# Patient Record
Sex: MALE | Race: WHITE | NOT HISPANIC OR LATINO | Employment: OTHER | URBAN - METROPOLITAN AREA
[De-identification: names, ages, dates, MRNs, and addresses within clinical notes are randomized per-mention and may not be internally consistent; named-entity substitution may affect disease eponyms.]

---

## 2023-03-11 LAB
CHOLESTEROL (MG/DL) IN SER/PLAS: 165 MG/DL (ref 0–199)
CHOLESTEROL IN HDL (MG/DL) IN SER/PLAS: 55.6 MG/DL
CHOLESTEROL/HDL RATIO: 3
LDL: 86 MG/DL (ref 0–99)
TRIGLYCERIDE (MG/DL) IN SER/PLAS: 119 MG/DL (ref 0–149)
VLDL: 24 MG/DL (ref 0–40)

## 2023-03-29 ENCOUNTER — HOSPITAL ENCOUNTER (OUTPATIENT)
Dept: DATA CONVERSION | Facility: HOSPITAL | Age: 68
End: 2023-03-29
Attending: INTERNAL MEDICINE | Admitting: INTERNAL MEDICINE

## 2023-03-29 DIAGNOSIS — Z95.1 PRESENCE OF AORTOCORONARY BYPASS GRAFT: ICD-10-CM

## 2023-03-29 DIAGNOSIS — J90 PLEURAL EFFUSION, NOT ELSEWHERE CLASSIFIED: ICD-10-CM

## 2023-03-29 DIAGNOSIS — R06.02 SHORTNESS OF BREATH: ICD-10-CM

## 2023-03-29 LAB
AMYLASE (U/L) IN BODY FLUID: 23 U/L
CELL CT,FLUID PATH REVIEW: NORMAL
CELLS COUNTED TOTAL (#) IN BODY FLUID: 100
CLARITY FLUID: NORMAL
COLOR OF BODY FLUID: NORMAL
ERYTHROCYTES (/UL) IN BODY FLUID: NORMAL /UL
GLUCOSE (MG/DL) IN BODY FLUID: 114 MG/DL
LACTATE DEHYDROGENASE (U/L) IN BODY FLUID BY LAC->PYR: 135 U/L
LEUKOCYTES (/UL) IN BODY FLUID: 2311 /UL
LYMPHOCYTES/100 LEUKOCYTES IN BODY FLUID BY MAN CT: 75 %
MONOCYTES+MACROPHAGES/100 WBC IN BODY FLUID BY MAN CT: 13 %
NEUTROPHILS/100 LEUKOCYTES IN BODY FLUID BY MANUAL COUNT: 1 %
PH OF BODY FLUID: 6.51
PROTEIN (G/DL) IN BODY FLUID: 4.4 G/DL
TRIGLYCERIDES (MG/DL) IN BODY FLUID: 32 MG/DL

## 2023-03-31 LAB
COMPLETE PATHOLOGY REPORT: NORMAL
CONVERTED CLINICAL DIAGNOSIS-HISTORY: NORMAL
CONVERTED DIAGNOSIS COMMENT: NORMAL
CONVERTED FINAL DIAGNOSIS: NORMAL
CONVERTED FINAL REPORT PDF LINK TO COPY AND PASTE: NORMAL
CONVERTED SPECIMEN DESCRIPTION: NORMAL

## 2023-10-22 DIAGNOSIS — I73.9 PAD (PERIPHERAL ARTERY DISEASE) (CMS-HCC): Primary | ICD-10-CM

## 2023-10-23 RX ORDER — CLOPIDOGREL BISULFATE 75 MG/1
75 TABLET ORAL DAILY
Qty: 30 TABLET | Refills: 0 | OUTPATIENT
Start: 2023-10-23

## 2023-10-27 RX ORDER — CLOPIDOGREL BISULFATE 75 MG/1
1 TABLET ORAL DAILY
COMMUNITY
Start: 2019-11-05 | End: 2023-10-27 | Stop reason: SDUPTHER

## 2023-11-20 DIAGNOSIS — I73.9 PAD (PERIPHERAL ARTERY DISEASE) (CMS-HCC): Primary | ICD-10-CM

## 2023-11-21 ENCOUNTER — OFFICE VISIT (OUTPATIENT)
Dept: VASCULAR SURGERY | Facility: CLINIC | Age: 68
End: 2023-11-21
Payer: MEDICARE

## 2023-11-21 VITALS — BODY MASS INDEX: 21.97 KG/M2 | HEIGHT: 67 IN | HEART RATE: 71 BPM | WEIGHT: 140 LBS | OXYGEN SATURATION: 95 %

## 2023-11-21 DIAGNOSIS — I73.9 CLAUDICATION (CMS-HCC): ICD-10-CM

## 2023-11-21 DIAGNOSIS — I65.23 BILATERAL CAROTID ARTERY STENOSIS: ICD-10-CM

## 2023-11-21 DIAGNOSIS — I73.9 PAD (PERIPHERAL ARTERY DISEASE) (CMS-HCC): ICD-10-CM

## 2023-11-21 PROCEDURE — 1159F MED LIST DOCD IN RCRD: CPT | Performed by: SURGERY

## 2023-11-21 PROCEDURE — 99214 OFFICE O/P EST MOD 30 MIN: CPT | Performed by: SURGERY

## 2023-11-21 PROCEDURE — 1160F RVW MEDS BY RX/DR IN RCRD: CPT | Performed by: SURGERY

## 2023-11-21 RX ORDER — ATORVASTATIN CALCIUM 40 MG/1
40 TABLET, FILM COATED ORAL
COMMUNITY
Start: 2023-09-27 | End: 2023-12-21 | Stop reason: SDUPTHER

## 2023-11-21 RX ORDER — ASPIRIN 81 MG/1
81 TABLET ORAL DAILY
COMMUNITY
Start: 2022-10-14

## 2023-11-21 RX ORDER — OMEGA-3/DHA/EPA/FISH OIL 300-1000MG
CAPSULE,DELAYED RELEASE (ENTERIC COATED) ORAL
COMMUNITY
Start: 2022-10-14

## 2023-11-21 RX ORDER — CLOPIDOGREL BISULFATE 75 MG/1
75 TABLET ORAL DAILY
Qty: 90 TABLET | Refills: 2 | Status: SHIPPED | OUTPATIENT
Start: 2023-11-21 | End: 2023-12-12 | Stop reason: ALTCHOICE

## 2023-11-21 RX ORDER — AMIODARONE HYDROCHLORIDE 200 MG/1
200 TABLET ORAL
COMMUNITY
Start: 2022-12-20 | End: 2024-01-31 | Stop reason: WASHOUT

## 2023-11-21 RX ORDER — SIMVASTATIN 40 MG/1
40 TABLET, FILM COATED ORAL DAILY
COMMUNITY
End: 2023-11-21 | Stop reason: ALTCHOICE

## 2023-11-21 RX ORDER — CLINDAMYCIN HYDROCHLORIDE 150 MG/1
150 CAPSULE ORAL
COMMUNITY
Start: 2023-04-11 | End: 2023-12-12 | Stop reason: ALTCHOICE

## 2023-11-21 RX ORDER — OXYCODONE HYDROCHLORIDE 5 MG/1
5 TABLET ORAL
COMMUNITY
Start: 2022-12-20 | End: 2023-12-12 | Stop reason: ALTCHOICE

## 2023-11-21 RX ORDER — CLOPIDOGREL BISULFATE 75 MG/1
75 TABLET ORAL DAILY
Qty: 30 TABLET | Refills: 0 | Status: SHIPPED | OUTPATIENT
Start: 2023-11-21 | End: 2023-12-12 | Stop reason: SDUPTHER

## 2023-11-21 RX ORDER — POLYSACCHARIDE-IRON COMPLEX 150 MG/1
150 CAPSULE ORAL DAILY
COMMUNITY
Start: 2022-12-20 | End: 2023-12-12 | Stop reason: ALTCHOICE

## 2023-11-21 RX ORDER — METOPROLOL SUCCINATE 50 MG/1
50 TABLET, EXTENDED RELEASE ORAL 2 TIMES DAILY
COMMUNITY
Start: 2022-09-28

## 2023-11-21 RX ORDER — LISINOPRIL 10 MG/1
10 TABLET ORAL
COMMUNITY
Start: 2023-11-20 | End: 2024-01-31 | Stop reason: SDUPTHER

## 2023-11-21 RX ORDER — HYDROGEN PEROXIDE 3 %
20 SOLUTION, NON-ORAL MISCELLANEOUS DAILY
COMMUNITY
Start: 2022-10-14

## 2023-11-21 RX ORDER — FUROSEMIDE 20 MG/1
20 TABLET ORAL
COMMUNITY
Start: 2023-09-27 | End: 2023-12-21 | Stop reason: SDUPTHER

## 2023-11-21 RX ORDER — ALLOPURINOL 100 MG/1
100 TABLET ORAL DAILY
COMMUNITY
Start: 2022-08-10

## 2023-11-21 ASSESSMENT — ENCOUNTER SYMPTOMS
HEMATOLOGIC/LYMPHATIC NEGATIVE: 1
ENDOCRINE NEGATIVE: 1
PSYCHIATRIC NEGATIVE: 1
RESPIRATORY NEGATIVE: 1
CARDIOVASCULAR NEGATIVE: 1
GASTROINTESTINAL NEGATIVE: 1
NEUROLOGICAL NEGATIVE: 1
CONSTITUTIONAL NEGATIVE: 1
MUSCULOSKELETAL NEGATIVE: 1
ALLERGIC/IMMUNOLOGIC NEGATIVE: 1

## 2023-11-21 NOTE — PROGRESS NOTES
History Of Present Illness  Josh Rehman Jr. is a 68 y.o. male presenting for carotid evaluation.  He states he had a visit with his PCP who heard a bruit in his carotid artery.  He was therefore sent for carotid duplex.  He denies any history of stroke.  He denies any lateralizing symptoms.  Carotid duplex done at [facility] reveals no significant stenosis bilaterally.    Patient is also known to me for PAD.  He has history of cross femoral bypass with subsequent interventions.  His last follow-up was 2 years ago.  At this time he does report mild claudication symptoms in his left calf.  However he states that it is pretty manageable and does not bother him much.  He states he quit smoking 1 year ago just before he had CABG surgery.     Past Medical History  He has a past medical history of Personal history of other diseases of the circulatory system (11/15/2022), Personal history of other diseases of the circulatory system (11/15/2022), Personal history of other diseases of the circulatory system (11/15/2022), Personal history of other diseases of the circulatory system (11/15/2022), and Personal history of other endocrine, nutritional and metabolic disease (11/15/2022).    Surgical History  He has a past surgical history that includes Other surgical history (05/30/2019); Other surgical history (05/30/2019); Other surgical history (11/26/2019); Other surgical history (01/05/2023); Other surgical history (11/21/2022); Other surgical history (06/22/2021); CT angio aorta and bilateral iliofemoral runoff w and or wo IV contrast (5/6/2019); and CT angio coronary art with heartflow if score >30% (8/19/2022).     Social History  He has no history on file for tobacco use, alcohol use, and drug use.    Family History  No family history on file.     Allergies  Cilostazol and Penicillins    Review of Systems   Constitutional: Negative.    HENT: Negative.     Respiratory: Negative.     Cardiovascular: Negative.          Claudication   Gastrointestinal: Negative.    Endocrine: Negative.    Genitourinary: Negative.    Musculoskeletal: Negative.    Allergic/Immunologic: Negative.    Neurological: Negative.    Hematological: Negative.    Psychiatric/Behavioral: Negative.          Physical Exam  Vitals reviewed.   Constitutional:       General: He is not in acute distress.     Appearance: Normal appearance. He is normal weight.   HENT:      Head: Normocephalic and atraumatic.   Eyes:      Extraocular Movements: Extraocular movements intact.      Conjunctiva/sclera: Conjunctivae normal.      Pupils: Pupils are equal, round, and reactive to light.   Neck:      Vascular: No carotid bruit.   Cardiovascular:      Rate and Rhythm: Normal rate and regular rhythm.      Pulses:           Femoral pulses are 2+ on the right side and 2+ on the left side.       Dorsalis pedis pulses are detected w/ Doppler on the right side and detected w/ Doppler on the left side.        Posterior tibial pulses are detected w/ Doppler on the right side and detected w/ Doppler on the left side.      Heart sounds: Normal heart sounds.   Pulmonary:      Effort: Pulmonary effort is normal.      Breath sounds: Normal breath sounds.   Abdominal:      General: Abdomen is flat. Bowel sounds are normal.      Palpations: Abdomen is soft.   Musculoskeletal:         General: No swelling or tenderness. Normal range of motion.      Cervical back: Normal range of motion and neck supple. No tenderness.   Skin:     General: Skin is warm and dry.      Capillary Refill: Capillary refill takes less than 2 seconds.   Neurological:      General: No focal deficit present.      Mental Status: He is alert and oriented to person, place, and time.      Cranial Nerves: No cranial nerve deficit.      Sensory: No sensory deficit.      Motor: No weakness.   Psychiatric:         Mood and Affect: Mood normal.         Behavior: Behavior normal.          Last Recorded Vitals  Pulse 71, height 1.702  "m (5' 7\"), weight 63.5 kg (140 lb), SpO2 95 %.    Relevant Results      Current Outpatient Medications:     allopurinol (Zyloprim) 100 mg tablet, Take 1 tablet (100 mg) by mouth once daily., Disp: , Rfl:     aspirin 81 mg EC tablet, Take 1 tablet (81 mg) by mouth once daily., Disp: , Rfl:     atorvastatin (Lipitor) 40 mg tablet, 1 tablet (40 mg)., Disp: , Rfl:     clindamycin (Cleocin) 150 mg capsule, 1 capsule (150 mg)., Disp: , Rfl:     clopidogrel (Plavix) 75 mg tablet, Take 1 tablet (75 mg) by mouth once daily., Disp: 90 tablet, Rfl: 2    clopidogrel (Plavix) 75 mg tablet, TAKE 1 TABLET DAILY, Disp: 30 tablet, Rfl: 0    esomeprazole (NexIUM) 20 mg DR capsule, Take 1 capsule (20 mg) by mouth once daily., Disp: , Rfl:     furosemide (Lasix) 20 mg tablet, 1 tablet (20 mg)., Disp: , Rfl:     iFerex 150 150 mg iron capsule, Take 1 capsule (150 mg) by mouth once daily., Disp: , Rfl:     lisinopril 10 mg tablet, 1 tablet (10 mg)., Disp: , Rfl:     metoprolol succinate XL (Toprol-XL) 50 mg 24 hr tablet, Take 1 tablet (50 mg) by mouth 2 times a day., Disp: , Rfl:     omega 3-dha-epa-fish oil (Fish OiL) 300-1,000 mg capsule,delayed release(DR/EC), Take by mouth once daily., Disp: , Rfl:     oxyCODONE (Roxicodone) 5 mg immediate release tablet, 1 tablet (5 mg)., Disp: , Rfl:     amiodarone (Pacerone) 200 mg tablet, 1 tablet (200 mg)., Disp: , Rfl:      US CAROTID BILAT + DOPPLER    Result Date: 11/17/2023  EXAMINATION: US CAROTID BILAT + DOPPLER 11/17/2023 01:46 PM CLINICAL HISTORY: right sided carotid bruit ASSOCIATED DIAGNOSIS: Bruit of right carotid artery ORDERING PROVIDER: BRIAN WETZEL COMPARISON: None TECHNIQUE: Ultrasound real time duplex ultrasound was performed of the carotid arterial systems bilaterally from the level of the clavicles to the mandibular angles using grayscale, spectral Doppler, and color Doppler. FINDINGS: RIGHT CAROTID SYSTEM: There is atherosclerotic plaque and/or intimal thickening visible " at the ICA. LEFT CAROTID SYSTEM: There is atherosclerotic plaque and/or intimal thickening visible at the ICA. PEAK SYSTOLIC VELOCITIES: RIGHT CCA: PROXIMAL- 105 cm/sec DISTAL- 79 cm/sec RIGHT ICA: PROXIMAL- 114 cm/sec MID- 102 cm/sec DISTAL- 126 cm/sec RIGHT ECA: 89 cm/sec RIGHT ICA/CCA  1.2 LEFT CCA: PROXIMAL  87 cm/sec MID  76 cm/sec DISTAL  44 cm/sec LEFT ICA: PROXIMAL  55 cm/sec MID  71 cm/sec DISTAL  67 cm/sec LEFT ECA: 47 cm/sec  LEFT ICA/CCA  0.9 VERTEBRAL ARTERIES: Right Vertebral: Patent with antegrade flow. Left Vertebral:  Patent with antegrade flow. IMPRESSION: Moderate (50-69%) right ICA stenosis, at the distal portion of the ICA. No significant left ICA stenosis. MACRO: None       Assessment/Plan   Diagnoses and all orders for this visit:  PAD (peripheral artery disease) (CMS/HCC)  -     Vascular US axillary-femoral or bypass graft complete bilateral; Future  -     CAROLA without exercise  Claudication (CMS/[facility])  -     Vascular US axillary-femoral or bypass graft complete bilateral; Future  -     CAROLA without exercise  Bilateral carotid artery stenosis  -     Vascular US carotid artery duplex bilateral; Future      69yo male presenting for carotid evaluation.  Duplex reveals no significant carotid stenosis.  He denies lateralizing symptoms and no focal deficits are noted on exam.  I would not recommend any further evaluation at this time.  However given his other cardiovascular risk factors, I would recommend repeat carotid duplex in 1 year.    Regarding his PAD, his claudication symptoms are well managed at this time.  However he has not had any follow-up studies in 2 years.  Therefore I will send him for a duplex of his cross femoral bypass and also for arterial Doppler studies.  He is to follow-up in 3 weeks to discuss results and any further recommendations.  For now he should continue all current medications and continue daily walking exercise.       I spent 30 minutes in the professional and overall  care of this patient.      Samia Bautista MD

## 2023-11-22 ENCOUNTER — TELEPHONE (OUTPATIENT)
Dept: CARDIOLOGY | Facility: CLINIC | Age: 68
End: 2023-11-22
Payer: MEDICARE

## 2023-11-22 NOTE — TELEPHONE ENCOUNTER
clopidogrel (Plavix) 75 mg tablet TAKE 1 TABLET DAILY     Pcp is trying to take pt off of his plavix and the pt is not sure what to due.

## 2023-12-07 ENCOUNTER — APPOINTMENT (OUTPATIENT)
Dept: RADIOLOGY | Facility: CLINIC | Age: 68
End: 2023-12-07
Payer: MEDICARE

## 2023-12-07 ENCOUNTER — HOSPITAL ENCOUNTER (OUTPATIENT)
Dept: VASCULAR MEDICINE | Facility: CLINIC | Age: 68
Discharge: HOME | End: 2023-12-07
Payer: MEDICARE

## 2023-12-07 ENCOUNTER — APPOINTMENT (OUTPATIENT)
Dept: VASCULAR MEDICINE | Facility: CLINIC | Age: 68
End: 2023-12-07
Payer: MEDICARE

## 2023-12-07 DIAGNOSIS — I73.9 CLAUDICATION (CMS-HCC): ICD-10-CM

## 2023-12-07 DIAGNOSIS — I73.9 PAD (PERIPHERAL ARTERY DISEASE) (CMS-HCC): ICD-10-CM

## 2023-12-07 PROCEDURE — 93922 UPR/L XTREMITY ART 2 LEVELS: CPT | Performed by: SURGERY

## 2023-12-07 PROCEDURE — 93930 UPPER EXTREMITY STUDY: CPT | Performed by: SURGERY

## 2023-12-07 PROCEDURE — 93922 UPR/L XTREMITY ART 2 LEVELS: CPT

## 2023-12-07 PROCEDURE — 93930 UPPER EXTREMITY STUDY: CPT

## 2023-12-12 ENCOUNTER — OFFICE VISIT (OUTPATIENT)
Dept: VASCULAR SURGERY | Facility: CLINIC | Age: 68
End: 2023-12-12
Payer: MEDICARE

## 2023-12-12 VITALS — HEART RATE: 66 BPM | HEIGHT: 67 IN | WEIGHT: 141 LBS | OXYGEN SATURATION: 97 % | BODY MASS INDEX: 22.13 KG/M2

## 2023-12-12 DIAGNOSIS — I73.9 CLAUDICATION (CMS-HCC): ICD-10-CM

## 2023-12-12 DIAGNOSIS — I73.9 PAD (PERIPHERAL ARTERY DISEASE) (CMS-HCC): Primary | ICD-10-CM

## 2023-12-12 PROCEDURE — 1159F MED LIST DOCD IN RCRD: CPT | Performed by: SURGERY

## 2023-12-12 PROCEDURE — 1160F RVW MEDS BY RX/DR IN RCRD: CPT | Performed by: SURGERY

## 2023-12-12 PROCEDURE — 99213 OFFICE O/P EST LOW 20 MIN: CPT | Performed by: SURGERY

## 2023-12-12 RX ORDER — BISMUTH SUBSALICYLATE 262 MG
1 TABLET,CHEWABLE ORAL DAILY
COMMUNITY

## 2023-12-12 RX ORDER — FOLIC ACID 1 MG/1
1 TABLET ORAL
COMMUNITY
Start: 2022-12-20

## 2023-12-12 RX ORDER — ACETAMINOPHEN 325 MG/1
2 TABLET ORAL AS NEEDED
COMMUNITY
Start: 2022-12-20

## 2023-12-12 NOTE — PROGRESS NOTES
History Of Present Illness  Josh Rehman Jr. is a 68 y.o. male presenting for PAD follow-up. He has history of left to right cross femoral bypass with subsequent interventions.  His last follow-up was 2 years ago.  At most recent visit last month he reported mild claudication symptoms in his left calf.  However he does state that this is tolerable.  He was sent for surveillance duplex of the bypass and CAROLA studies.     Past Medical History  He has a past medical history of Personal history of other diseases of the circulatory system (11/15/2022), Personal history of other diseases of the circulatory system (11/15/2022), Personal history of other diseases of the circulatory system (11/15/2022), Personal history of other diseases of the circulatory system (11/15/2022), and Personal history of other endocrine, nutritional and metabolic disease (11/15/2022).    Surgical History  He has a past surgical history that includes Other surgical history (05/30/2019); Other surgical history (05/30/2019); Other surgical history (11/26/2019); Other surgical history (01/05/2023); Other surgical history (11/21/2022); Other surgical history (06/22/2021); CT angio aorta and bilateral iliofemoral runoff w and or wo IV contrast (5/6/2019); and CT angio coronary art with heartflow if score >30% (8/19/2022).     Social History  He reports that he quit smoking about 14 months ago. His smoking use included cigarettes. He has never used smokeless tobacco. No history on file for alcohol use and drug use.    Family History  No family history on file.     Allergies  Cilostazol and Penicillins    Review of Systems   Constitutional: Negative.    HENT: Negative.     Respiratory: Negative.     Cardiovascular: Negative.         Claudication   Gastrointestinal: Negative.    Endocrine: Negative.    Genitourinary: Negative.    Musculoskeletal: Negative.    Allergic/Immunologic: Negative.    Neurological: Negative.    Hematological: Negative.   "  Psychiatric/Behavioral: Negative.          Physical Exam  Vitals reviewed.   Constitutional:       General: He is not in acute distress.     Appearance: Normal appearance. He is normal weight.   HENT:      Head: Normocephalic and atraumatic.   Eyes:      Extraocular Movements: Extraocular movements intact.      Conjunctiva/sclera: Conjunctivae normal.      Pupils: Pupils are equal, round, and reactive to light.   Neck:      Vascular: No carotid bruit.   Cardiovascular:      Rate and Rhythm: Normal rate and regular rhythm.      Pulses:           Femoral pulses are 2+ on the right side and 2+ on the left side.       Dorsalis pedis pulses are detected w/ Doppler on the right side and detected w/ Doppler on the left side.        Posterior tibial pulses are detected w/ Doppler on the right side and detected w/ Doppler on the left side.      Heart sounds: Normal heart sounds.   Pulmonary:      Effort: Pulmonary effort is normal.      Breath sounds: Normal breath sounds.   Abdominal:      General: Abdomen is flat. Bowel sounds are normal.      Palpations: Abdomen is soft.   Musculoskeletal:         General: No swelling or tenderness. Normal range of motion.      Cervical back: Normal range of motion and neck supple. No tenderness.   Skin:     General: Skin is warm and dry.      Capillary Refill: Capillary refill takes less than 2 seconds.   Neurological:      General: No focal deficit present.      Mental Status: He is alert and oriented to person, place, and time.      Cranial Nerves: No cranial nerve deficit.      Sensory: No sensory deficit.      Motor: No weakness.   Psychiatric:         Mood and Affect: Mood normal.         Behavior: Behavior normal.          Last Recorded Vitals  Pulse 66, height 1.702 m (5' 7\"), weight 64 kg (141 lb), SpO2 97 %.    Relevant Results    Current Outpatient Medications:     acetaminophen (Tylenol) 325 mg tablet, Take 2 tablets (650 mg) by mouth if needed., Disp: , Rfl:     allopurinol " (Zyloprim) 100 mg tablet, Take 1 tablet (100 mg) by mouth once daily., Disp: , Rfl:     aspirin 81 mg EC tablet, Take 1 tablet (81 mg) by mouth once daily., Disp: , Rfl:     atorvastatin (Lipitor) 40 mg tablet, 1 tablet (40 mg)., Disp: , Rfl:     esomeprazole (NexIUM) 20 mg DR capsule, Take 1 capsule (20 mg) by mouth once daily., Disp: , Rfl:     folic acid (Folvite) 1 mg tablet, Take 1 tablet (1 mg) by mouth once daily., Disp: , Rfl:     furosemide (Lasix) 20 mg tablet, 1 tablet (20 mg)., Disp: , Rfl:     lisinopril 10 mg tablet, 1 tablet (10 mg)., Disp: , Rfl:     metoprolol succinate XL (Toprol-XL) 50 mg 24 hr tablet, Take 1 tablet (50 mg) by mouth 2 times a day., Disp: , Rfl:     multivitamin tablet, Take 1 tablet by mouth once daily., Disp: , Rfl:     omega 3-dha-epa-fish oil (Fish OiL) 300-1,000 mg capsule,delayed release(DR/EC), Take by mouth once daily., Disp: , Rfl:     amiodarone (Pacerone) 200 mg tablet, 1 tablet (200 mg)., Disp: , Rfl:      Vascular US axillary-femoral or bypass graft complete bilateral    Result Date: 12/7/2023           [facility], Vascular Lab [address], [place] [address]             Tel [phone] and Fax [phone]  Vascular Lab Report VASC US AXILLARY-FEMORAL OR BYPASS GRAFT COMPLETE BILATERAL  Patient Name:      ESTEFANI Reed Physician: [id number] Adolph Burris DO Study Date:        12/7/2023          Ordering           [id number] QUANG SHANNON                                       Physician:         SADIA MRN/PID:           [id number]           Technologist:      Nancy Tobin RVT Accession#:        [id number]       Technologist 2: Date of Birth/Age: 1955 / 68      Encounter#:        [id number]                    years Gender:            M Admission Status:  Outpatient         Location           [facility]                                       Performed:  Diagnosis/ICD: Peripheral vascular disease,  unspecified-I73.9 Indication:    Bypass graft surveillance CPT Codes:     62670 Peripheral artery Lower arterial Duplex BPG  CONCLUSIONS: Left Bypass Graft: The left to right femoral-femoral bypass graft appears widely patent demonstrates a <50% stenosis. The bypass is constructed of PTFE graft.  Imaging & Doppler Findings:  Bypass Graft Surveillance: Left to Right Fem-Fem Inflow Artery    134 cm/s Prox Anast       136 cm/s Prox Graft       68 cm/s Prox/Mid Graft   46 cm/s Mid Graft        41 cm/s Mid/Distal Graft 43 cm/s Distal Graft     45 cm/s Distal Anast     36 cm/s Outflow Artery   89 cm/s   Right                      Left   PSV                       PSV                EIA        183 cm/s 89 cm/s        CFA        114 cm/s 56 cm/s Profunda Proximal 188 cm/s  [id number] Adolph Burris DO Electronically signed by [id number] Burris DO on 12/7/2023 at 3:52:08 PM  ** Final **     CAROLA without exercise    Result Date: 12/7/2023           [facility], Vascular Lab [address], [place] [address]             Tel [phone] and Fax [phone]  Vascular Lab Report [facility] ANKLE BRACHIAL INDEX (CAROLA) WITHOUT EXERCISE  Patient Name:      ESTEFANI Reed Physician: [id number] Adolph Burris DO Study Date:        12/7/2023          Ordering           [id number] QUANG SHANNON                                       Physician:         SADIA MRN/PID:           [id number]           Technologist:      Nancy Tobin RVT Accession#:        [id number]       Technologist 2: Date of Birth/Age: 1955 / 68      Encounter#:        [id number]                    years Gender:            M Admission Status:  Outpatient         Location           [facility]                                       Performed:  Diagnosis/ICD: Peripheral vascular disease, unspecified-I73.9 Indication:    Peripheral vascular disease CPT Codes:     35917 Peripheral artery CAROLA Only  Patient History H/o Left to right  fem-fem BPG.  CONCLUSIONS: Right Lower PVR: No evidence of arterial occlusive disease in the right lower extremity at rest. Decreased digital perfusion noted. Biphasic flow is noted in the right posterior tibial artery and right dorsalis pedis artery. Left Lower PVR: Evidence of moderate arterial occlusive disease in the left lower extremity at rest. Decreased digital perfusion noted. Monophasic flow is noted in the left dorsalis pedis artery and left posterior tibial artery.  Imaging & Doppler Findings:  RIGHT Lower PVR                Pressures Ratios Right Posterior Tibial (Ankle) 123 mmHg  0.91 Right Dorsalis Pedis (Ankle)   126 mmHg  0.93 Right Digit (Great Toe)        76 mmHg   0.56   LEFT Lower PVR                Pressures Ratios Left Posterior Tibial (Ankle) 89 mmHg   0.66 Left Dorsalis Pedis (Ankle)   89 mmHg   0.66 Left Digit (Great Toe)        45 mmHg   0.33                     Right     Left Brachial Pressure 135 mmHg 133 mmHg   [id number] Adloph Burris DO Electronically signed by [id number] Adolph Burris DO on 12/7/2023 at 2:17:17 PM  ** Final **        Assessment/Plan   Diagnoses and all orders for this visit:  PAD (peripheral artery disease) (CMS/[facility])  -     CAROLA without exercise; Future  Claudication (CMS/[facility])      67yo male with PAD and history of left to right cross femoral bypass.  He does report mild but tolerable claudication symptoms of the left calf.  CAROLA study reveals normal CAROLA on the right with decreased on the left.  His symptoms and the findings on CAROLA study are consistent.  Given his symptoms are manageable at this time, I would not recommend any invasive intervention.  He is to continue medical management with aspirin and statin.  He is to follow-up in 6 months with repeat CAROLA/TBI study.       I spent 20 minutes in the professional and overall care of this patient.      Samia Bautista MD

## 2023-12-13 PROBLEM — I65.23 BILATERAL CAROTID ARTERY STENOSIS: Status: RESOLVED | Noted: 2023-11-21 | Resolved: 2023-12-13

## 2023-12-13 ASSESSMENT — ENCOUNTER SYMPTOMS
CARDIOVASCULAR NEGATIVE: 1
HEMATOLOGIC/LYMPHATIC NEGATIVE: 1
MUSCULOSKELETAL NEGATIVE: 1
PSYCHIATRIC NEGATIVE: 1
NEUROLOGICAL NEGATIVE: 1
ALLERGIC/IMMUNOLOGIC NEGATIVE: 1
RESPIRATORY NEGATIVE: 1
GASTROINTESTINAL NEGATIVE: 1
CONSTITUTIONAL NEGATIVE: 1
ENDOCRINE NEGATIVE: 1

## 2023-12-21 DIAGNOSIS — I50.30 HEART FAILURE WITH PRESERVED EJECTION FRACTION, UNSPECIFIED HF CHRONICITY (MULTI): Primary | ICD-10-CM

## 2023-12-21 DIAGNOSIS — E78.5 HYPERLIPIDEMIA, UNSPECIFIED HYPERLIPIDEMIA TYPE: Primary | ICD-10-CM

## 2023-12-21 PROBLEM — E78.1 HIGH TRIGLYCERIDES: Status: ACTIVE | Noted: 2023-12-21

## 2023-12-21 PROBLEM — Z95.828 S/P BYPASS GRAFT OF EXTREMITY: Status: ACTIVE | Noted: 2023-12-21

## 2023-12-21 RX ORDER — FUROSEMIDE 20 MG/1
20 TABLET ORAL DAILY PRN
Qty: 90 TABLET | Refills: 0 | Status: SHIPPED | OUTPATIENT
Start: 2023-12-21

## 2023-12-21 RX ORDER — ATORVASTATIN CALCIUM 40 MG/1
40 TABLET, FILM COATED ORAL DAILY
Qty: 90 TABLET | Refills: 0 | Status: SHIPPED | OUTPATIENT
Start: 2023-12-21 | End: 2024-01-31 | Stop reason: SDUPTHER

## 2024-01-22 PROBLEM — R07.89 CHEST PAIN, ATYPICAL: Status: ACTIVE | Noted: 2024-01-22

## 2024-01-22 PROBLEM — R19.5 POSITIVE FIT (FECAL IMMUNOCHEMICAL TEST): Status: ACTIVE | Noted: 2022-03-29

## 2024-01-22 PROBLEM — K21.9 GASTROESOPHAGEAL REFLUX DISEASE: Status: ACTIVE | Noted: 2021-10-07

## 2024-01-22 PROBLEM — I48.91 ATRIAL FIBRILLATION (MULTI): Status: ACTIVE | Noted: 2024-01-22

## 2024-01-22 PROBLEM — I50.30 (HFPEF) HEART FAILURE WITH PRESERVED EJECTION FRACTION (MULTI): Status: ACTIVE | Noted: 2024-01-22

## 2024-01-22 PROBLEM — I25.10 CORONARY ARTERY CALCIFICATION: Status: ACTIVE | Noted: 2024-01-22

## 2024-01-22 PROBLEM — R06.02 SHORTNESS OF BREATH ON EXERTION: Status: ACTIVE | Noted: 2024-01-22

## 2024-01-22 PROBLEM — M10.9 GOUT: Status: ACTIVE | Noted: 2024-01-22

## 2024-01-22 PROBLEM — R94.39 ABNORMAL STRESS TEST: Status: ACTIVE | Noted: 2024-01-22

## 2024-01-22 PROBLEM — I25.84 CORONARY ARTERY CALCIFICATION: Status: ACTIVE | Noted: 2024-01-22

## 2024-01-29 ENCOUNTER — APPOINTMENT (OUTPATIENT)
Dept: CARDIOLOGY | Facility: CLINIC | Age: 69
End: 2024-01-29
Payer: MEDICARE

## 2024-01-31 ENCOUNTER — OFFICE VISIT (OUTPATIENT)
Dept: CARDIOLOGY | Facility: CLINIC | Age: 69
End: 2024-01-31
Payer: MEDICARE

## 2024-01-31 VITALS
BODY MASS INDEX: 21.93 KG/M2 | OXYGEN SATURATION: 96 % | HEART RATE: 70 BPM | WEIGHT: 140 LBS | SYSTOLIC BLOOD PRESSURE: 125 MMHG | DIASTOLIC BLOOD PRESSURE: 80 MMHG

## 2024-01-31 DIAGNOSIS — E78.1 HIGH TRIGLYCERIDES: ICD-10-CM

## 2024-01-31 DIAGNOSIS — I73.9 PAD (PERIPHERAL ARTERY DISEASE) (CMS-HCC): Primary | ICD-10-CM

## 2024-01-31 DIAGNOSIS — E78.5 HYPERLIPIDEMIA, UNSPECIFIED HYPERLIPIDEMIA TYPE: ICD-10-CM

## 2024-01-31 DIAGNOSIS — I10 BENIGN ESSENTIAL HTN: ICD-10-CM

## 2024-01-31 DIAGNOSIS — I48.91 ATRIAL FIBRILLATION, UNSPECIFIED TYPE (MULTI): ICD-10-CM

## 2024-01-31 DIAGNOSIS — I25.10 CORONARY ARTERY DISEASE INVOLVING NATIVE CORONARY ARTERY, UNSPECIFIED WHETHER ANGINA PRESENT, UNSPECIFIED WHETHER NATIVE OR TRANSPLANTED HEART: ICD-10-CM

## 2024-01-31 PROCEDURE — 1160F RVW MEDS BY RX/DR IN RCRD: CPT | Performed by: INTERNAL MEDICINE

## 2024-01-31 PROCEDURE — 3078F DIAST BP <80 MM HG: CPT | Performed by: INTERNAL MEDICINE

## 2024-01-31 PROCEDURE — 1159F MED LIST DOCD IN RCRD: CPT | Performed by: INTERNAL MEDICINE

## 2024-01-31 PROCEDURE — 3074F SYST BP LT 130 MM HG: CPT | Performed by: INTERNAL MEDICINE

## 2024-01-31 PROCEDURE — 1036F TOBACCO NON-USER: CPT | Performed by: INTERNAL MEDICINE

## 2024-01-31 PROCEDURE — 93000 ELECTROCARDIOGRAM COMPLETE: CPT | Performed by: INTERNAL MEDICINE

## 2024-01-31 PROCEDURE — 99214 OFFICE O/P EST MOD 30 MIN: CPT | Performed by: INTERNAL MEDICINE

## 2024-01-31 RX ORDER — ATORVASTATIN CALCIUM 40 MG/1
40 TABLET, FILM COATED ORAL DAILY
Qty: 90 TABLET | Refills: 3 | Status: SHIPPED | OUTPATIENT
Start: 2024-01-31

## 2024-01-31 RX ORDER — LISINOPRIL 10 MG/1
10 TABLET ORAL DAILY
Qty: 90 TABLET | Refills: 3 | Status: SHIPPED | OUTPATIENT
Start: 2024-01-31

## 2024-01-31 NOTE — PROGRESS NOTES
Chief Complaint:   Follow-up (Patient states he is here for a 6 month follow up)     History Of Present Illness:    Josh Rehman Jr. is a 69 y.o. male presenting with cv dz.  Nataliia claudication  Patient denies chest pain/SOB/palpitations/dizziness/lightheadedness/edema  Active-total gym and light weights       Last Recorded Vitals:  Vitals:    01/31/24 1324 01/31/24 1349   BP: 130/64 125/80   BP Location: Right arm    Patient Position: Sitting    BP Cuff Size: Adult    Pulse: 70    SpO2: 96%    Weight: 63.5 kg (140 lb)             Allergies:  Cilostazol and Penicillins    Outpatient Medications:  Current Outpatient Medications   Medication Instructions    acetaminophen (Tylenol) 325 mg tablet 2 tablets, oral, As needed    allopurinol (ZYLOPRIM) 100 mg, oral, Daily    aspirin 81 mg, oral, Daily    atorvastatin (LIPITOR) 40 mg, oral, Daily    esomeprazole (NEXIUM) 20 mg, oral, Daily    folic acid (Folvite) 1 mg tablet 1 tablet, oral, Daily RT    furosemide (LASIX) 20 mg, oral, Daily PRN    lisinopril 10 mg    metoprolol succinate XL (TOPROL-XL) 50 mg, oral, 2 times daily    multivitamin tablet 1 tablet, oral, Daily    omega 3-dha-epa-fish oil (Fish OiL) 300-1,000 mg capsule,delayed release(DR/EC) oral, Daily RT       Physical Exam:  Constitutional:       Appearance: Healthy appearance. Not in distress.   Neck:      Vascular: No JVR. JVD normal.   Pulmonary:      Effort: Pulmonary effort is normal.      Breath sounds: Normal breath sounds. No wheezing. No rhonchi. No rales.   Chest:      Chest wall: Not tender to palpatation.   Cardiovascular:      PMI at left midclavicular line. Normal rate. Regular rhythm. Normal S1. Normal S2.       Murmurs: There is no murmur.      No gallop.  No click. No rub.   Pulses:     Intact distal pulses.   Edema:     Peripheral edema absent.   Abdominal:      General: Bowel sounds are normal.      Palpations: Abdomen is soft.      Tenderness: There is no abdominal tenderness.    Musculoskeletal: Normal range of motion.         General: No tenderness. Skin:     General: Skin is warm and dry.   Neurological:      General: No focal deficit present.      Mental Status: Alert and oriented to person, place and time.          Last Labs:  CBC -  Lab Results   Component Value Date    WBC 9.4 12/20/2022    HGB 9.5 (L) 12/20/2022    HCT 29.3 (L) 12/20/2022    MCV 99 12/20/2022     12/20/2022       CMP -  Lab Results   Component Value Date    CALCIUM 9.7 01/16/2023    PHOS 2.8 12/20/2022    PROT 3.5 (L) 12/13/2022    ALBUMIN 3.2 (L) 12/20/2022    AST 76 (H) 12/13/2022    ALT 20 12/13/2022    ALKPHOS 29 (L) 12/13/2022    BILITOT 0.7 12/13/2022       LIPID PANEL -   Lab Results   Component Value Date    CHOL 165 03/11/2023    TRIG 119 03/11/2023    HDL 55.6 03/11/2023    CHHDL 3.0 03/11/2023    LDLF 86 03/11/2023    VLDL 24 03/11/2023    NHDL 96 10/12/2022       RENAL FUNCTION PANEL -   Lab Results   Component Value Date    GLUCOSE 110 (H) 01/16/2023     01/16/2023    K 4.8 01/16/2023     01/16/2023    CO2 30 01/16/2023    ANIONGAP 14 01/16/2023    BUN 17 01/16/2023    CREATININE 1.37 (H) 01/16/2023    GFRMALE 55 (A) 01/16/2023    CALCIUM 9.7 01/16/2023    PHOS 2.8 12/20/2022    ALBUMIN 3.2 (L) 12/20/2022        Lab Results   Component Value Date    HGBA1C 6.0 (A) 12/09/2022    HGBA1C 5.0 10/07/2021           Lab review: I have personally reviewed the laboratory result(s)       Problem List Items Addressed This Visit       PAD (peripheral artery disease) (CMS/[facility]) - Primary    Overview     s/p 11/2019 L-R fem / fem BPG   Some current claudication   Mod LLE dz per 12/2023 PVR   On ASA/ statin         Hyperlipidemia    Overview     On moderate intensity statin … 3/2023 LDL=86 - changed to HI statin    Recheck lipids         Benign essential HTN    Overview     BP well controlled         Relevant Orders    ECG 12 lead (Clinic Performed) (Completed)    High triglycerides    Overview      10/2022 BU=037   Takes fish oil   3/2023 YB=263           Atrial fibrillation (CMS/HCC)    Overview     Transient postop A-fib   In NSR today  On ASA  Does have #35 AtriClip in place         Coronary artery disease    Overview     11/2022 cath with MV CAD after abnormal stress test   12/13/2022 CABG: LIMA to LAD, sequela SVG to RCA / OM2, SVG to OM1   No current angina   On DAPT / statin / beta blocker   Completed Cardiac Rehab   Follow            Lipid panel    Bobby Goldstein, DO

## 2024-02-07 ENCOUNTER — LAB (OUTPATIENT)
Dept: LAB | Facility: LAB | Age: 69
End: 2024-02-07
Payer: MEDICARE

## 2024-02-07 DIAGNOSIS — E78.5 HYPERLIPIDEMIA, UNSPECIFIED HYPERLIPIDEMIA TYPE: ICD-10-CM

## 2024-02-07 LAB
CHOLEST SERPL-MCNC: 126 MG/DL (ref 0–199)
CHOLESTEROL/HDL RATIO: 2
HDLC SERPL-MCNC: 64.5 MG/DL
LDLC SERPL CALC-MCNC: 49 MG/DL
NON HDL CHOLESTEROL: 62 MG/DL (ref 0–149)
TRIGL SERPL-MCNC: 63 MG/DL (ref 0–149)
VLDL: 13 MG/DL (ref 0–40)

## 2024-02-07 PROCEDURE — 36415 COLL VENOUS BLD VENIPUNCTURE: CPT

## 2024-02-07 PROCEDURE — 80061 LIPID PANEL: CPT

## 2024-06-10 ENCOUNTER — HOSPITAL ENCOUNTER (OUTPATIENT)
Dept: VASCULAR MEDICINE | Facility: CLINIC | Age: 69
Discharge: HOME | End: 2024-06-10
Payer: MEDICARE

## 2024-06-10 DIAGNOSIS — I73.89 OTHER SPECIFIED PERIPHERAL VASCULAR DISEASES (CMS-HCC): ICD-10-CM

## 2024-06-10 DIAGNOSIS — I73.9 PAD (PERIPHERAL ARTERY DISEASE) (CMS-HCC): ICD-10-CM

## 2024-06-10 PROCEDURE — 93922 UPR/L XTREMITY ART 2 LEVELS: CPT | Performed by: STUDENT IN AN ORGANIZED HEALTH CARE EDUCATION/TRAINING PROGRAM

## 2024-06-10 PROCEDURE — 93922 UPR/L XTREMITY ART 2 LEVELS: CPT

## 2024-06-18 ENCOUNTER — APPOINTMENT (OUTPATIENT)
Dept: VASCULAR SURGERY | Facility: CLINIC | Age: 69
End: 2024-06-18
Payer: MEDICARE

## 2024-06-18 VITALS
HEIGHT: 67 IN | DIASTOLIC BLOOD PRESSURE: 76 MMHG | SYSTOLIC BLOOD PRESSURE: 130 MMHG | WEIGHT: 138 LBS | HEART RATE: 62 BPM | OXYGEN SATURATION: 96 % | BODY MASS INDEX: 21.66 KG/M2

## 2024-06-18 DIAGNOSIS — I73.9 PAD (PERIPHERAL ARTERY DISEASE) (CMS-HCC): ICD-10-CM

## 2024-06-18 DIAGNOSIS — Z95.828 S/P BYPASS GRAFT OF EXTREMITY: ICD-10-CM

## 2024-06-18 PROCEDURE — 99214 OFFICE O/P EST MOD 30 MIN: CPT | Performed by: SURGERY

## 2024-06-18 PROCEDURE — 3078F DIAST BP <80 MM HG: CPT | Performed by: SURGERY

## 2024-06-18 PROCEDURE — 1036F TOBACCO NON-USER: CPT | Performed by: SURGERY

## 2024-06-18 PROCEDURE — 3075F SYST BP GE 130 - 139MM HG: CPT | Performed by: SURGERY

## 2024-06-18 PROCEDURE — 1159F MED LIST DOCD IN RCRD: CPT | Performed by: SURGERY

## 2024-06-18 PROCEDURE — 1160F RVW MEDS BY RX/DR IN RCRD: CPT | Performed by: SURGERY

## 2024-06-18 NOTE — PROGRESS NOTES
History Of Present Illness  Josh Rehman Jr. is a 69 y.o. male presenting for PAD follow-up.  He has history of left to right cross femoral bypass in 2019.  His last visit was 6 months ago.  He states his claudication symptoms remain mild and tolerable.  He has no complaints.  Recent CAROLA/PVR study reveals ABIs of 0.97 on the right and 0.75 on the left, both of which are stable from the previous study.     Past Medical History  He has a past medical history of Personal history of other diseases of the circulatory system (11/15/2022), Personal history of other diseases of the circulatory system (11/15/2022), Personal history of other diseases of the circulatory system (11/15/2022), Personal history of other diseases of the circulatory system (11/15/2022), and Personal history of other endocrine, nutritional and metabolic disease (11/15/2022).    Surgical History  He has a past surgical history that includes Other surgical history (05/30/2019); Other surgical history (05/30/2019); Other surgical history (11/26/2019); Other surgical history (01/05/2023); Other surgical history (11/21/2022); Other surgical history (06/22/2021); CT angio aorta and bilateral iliofemoral runoff w and or wo IV contrast (5/6/2019); and CT angio coronary art with heartflow if score >30% (8/19/2022).     Social History  He reports that he quit smoking about 20 months ago. His smoking use included cigarettes. He has never used smokeless tobacco. No history on file for alcohol use and drug use.    Family History  No family history on file.     Allergies  Cilostazol and Penicillins    Review of Systems     Physical Exam  Vitals reviewed.   Constitutional:       General: He is not in acute distress.     Appearance: Normal appearance. He is normal weight.   HENT:      Head: Normocephalic and atraumatic.   Eyes:      Extraocular Movements: Extraocular movements intact.      Conjunctiva/sclera: Conjunctivae normal.   Neck:      Vascular: No carotid  "bruit.   Cardiovascular:      Rate and Rhythm: Normal rate and regular rhythm.      Pulses:           Femoral pulses are 2+ on the right side and 2+ on the left side.       Dorsalis pedis pulses are detected w/ Doppler on the right side and detected w/ Doppler on the left side.        Posterior tibial pulses are detected w/ Doppler on the right side and detected w/ Doppler on the left side.      Heart sounds: Normal heart sounds.   Pulmonary:      Effort: Pulmonary effort is normal.      Breath sounds: Normal breath sounds.   Abdominal:      General: Abdomen is flat.      Palpations: Abdomen is soft.   Musculoskeletal:         General: No swelling or tenderness. Normal range of motion.      Cervical back: Normal range of motion and neck supple. No tenderness.   Skin:     General: Skin is warm and dry.      Capillary Refill: Capillary refill takes less than 2 seconds.   Neurological:      General: No focal deficit present.      Mental Status: He is alert and oriented to person, place, and time.      Cranial Nerves: No cranial nerve deficit.      Sensory: No sensory deficit.      Motor: No weakness.   Psychiatric:         Mood and Affect: Mood normal.         Behavior: Behavior normal.          Last Recorded Vitals  Blood pressure 130/76, pulse 62, height 1.702 m (5' 7\"), weight 62.6 kg (138 lb), SpO2 96%.    Relevant Results      Current Outpatient Medications:     acetaminophen (Tylenol) 325 mg tablet, Take 2 tablets (650 mg) by mouth if needed., Disp: , Rfl:     allopurinol (Zyloprim) 100 mg tablet, Take 1 tablet (100 mg) by mouth once daily., Disp: , Rfl:     aspirin 81 mg EC tablet, Take 1 tablet (81 mg) by mouth once daily., Disp: , Rfl:     atorvastatin (Lipitor) 40 mg tablet, Take 1 tablet (40 mg) by mouth once daily., Disp: 90 tablet, Rfl: 3    esomeprazole (NexIUM) 20 mg DR capsule, Take 1 capsule (20 mg) by mouth once daily., Disp: , Rfl:     folic acid (Folvite) 1 mg tablet, Take 1 tablet (1 mg) by mouth " once daily., Disp: , Rfl:     furosemide (Lasix) 20 mg tablet, Take 1 tablet (20 mg) by mouth once daily as needed (for edema)., Disp: 90 tablet, Rfl: 0    lisinopril 10 mg tablet, Take 1 tablet (10 mg) by mouth once daily., Disp: 90 tablet, Rfl: 3    metoprolol succinate XL (Toprol-XL) 50 mg 24 hr tablet, Take 1 tablet (50 mg) by mouth 2 times a day., Disp: , Rfl:     multivitamin tablet, Take 1 tablet by mouth once daily., Disp: , Rfl:     omega 3-dha-epa-fish oil (Fish OiL) 300-1,000 mg capsule,delayed release(DR/EC), Take by mouth once daily., Disp: , Rfl:        CAROLA without exercise    Result Date: 6/10/2024           [facility], Vascular Lab [address], [place] [address]             Tel [phone] and Fax [phone]  Vascular Lab Report [facility] ANKLE BRACHIAL INDEX (CAROLA) WITHOUT EXERCISE  Patient Name:      ESTEFANI Reed Physician:  [id number] Martinez Fisher MD Study Date:        6/10/2024           Ordering Physician: [id number] QUANG MCCULLOUGH MRN/PID:           [id number]            Technologist:       Lauro Veliz RVT, RDMS Accession#:        [id number]        Technologist 2: Date of Birth/Age: 1955 / 69 years Encounter#:         [id number] Gender:            M Admission Status:  Outpatient          Location Performed: [facility]  Diagnosis/ICD: Other specified peripheral vascular diseases-I73.89 CPT Codes:     79033 Peripheral artery CAROLA Only  Patient History S/P Ax-fem RT-fem LT BPG.  CONCLUSIONS: Right Lower PVR: No evidence of arterial occlusive disease in the right lower extremity at rest. Decreased digital perfusion noted. Multiphasic flow is noted in the right common  femoral artery, right posterior tibial artery and right dorsalis pedis artery. Left Lower PVR: Evidence of mild arterial occlusive disease in the left lower extremity at rest. Decreased digital perfusion noted. Multiphasic flow is noted in the left dorsalis pedis artery, left posterior tibial artery and left common femoral artery.  Imaging & Doppler Findings:  RIGHT Lower PVR                Pressures Ratios Right Posterior Tibial (Ankle) 146 mmHg  0.97 Right Dorsalis Pedis (Ankle)   138 mmHg  0.92 Right Digit (Great Toe)        76 mmHg   0.51   LEFT Lower PVR                Pressures Ratios Left Posterior Tibial (Ankle) 112 mmHg  0.75 Left Dorsalis Pedis (Ankle)   110 mmHg  0.73 Left Digit (Great Toe)        52 mmHg   0.35                     Right     Left Brachial Pressure 150 mmHg 148 mmHg   [id number] Martinez Fisher MD Electronically signed by [id number] Martinez Fisher MD on 6/10/2024 at 12:46:53 PM  ** Final **          Assessment/Plan   Diagnoses and all orders for this visit:  S/P bypass graft of extremity  -     CAROLA without exercise; Future  -     Vascular US lower extremity arterial duplex bilateral; Future  PAD (peripheral artery disease) (CMS-HCC)  -     CAROLA without exercise; Future  -     Vascular US lower extremity arterial duplex bilateral; Future      68yo male with PAD and history of left to right cross femoral bypass. He does report mild but tolerable claudication symptoms of the left calf. CAROLA study reveals stable findings with CAROLA of 0.97 on the right and 0.75 on the left.  He is to continue medical management with aspirin and statin. He is to follow-up in 1 year with repeat CAROLA study and repeat surveillance duplex of his bypass graft.  He will also be due for carotid follow-up that time as well.             Samia Bautista MD

## 2024-07-11 PROBLEM — F10.90 ALCOHOL USE, UNSPECIFIED, UNCOMPLICATED: Status: ACTIVE | Noted: 2022-12-20

## 2024-07-11 PROBLEM — G47.33 OBSTRUCTIVE SLEEP APNEA, ADULT: Status: ACTIVE | Noted: 2024-02-13

## 2024-07-11 PROBLEM — Z95.1 S/P CABG (CORONARY ARTERY BYPASS GRAFT): Status: ACTIVE | Noted: 2024-02-13

## 2024-07-11 PROBLEM — R94.2 ABNORMAL PFT: Status: ACTIVE | Noted: 2024-02-13

## 2024-07-11 PROBLEM — Z86.79 HISTORY OF PERIPHERAL VASCULAR DISEASE: Status: ACTIVE | Noted: 2022-11-15

## 2024-07-11 PROBLEM — Z86.79 HISTORY OF PERIPHERAL VASCULAR DISEASE: Status: RESOLVED | Noted: 2022-11-15 | Resolved: 2024-07-11

## 2024-07-11 PROBLEM — Z86.79 HISTORY OF ANGINA PECTORIS: Status: ACTIVE | Noted: 2024-07-11

## 2024-07-31 ENCOUNTER — APPOINTMENT (OUTPATIENT)
Dept: CARDIOLOGY | Facility: CLINIC | Age: 69
End: 2024-07-31
Payer: MEDICARE

## 2024-09-09 ENCOUNTER — APPOINTMENT (OUTPATIENT)
Dept: CARDIOLOGY | Facility: CLINIC | Age: 69
End: 2024-09-09
Payer: MEDICARE

## 2024-09-09 VITALS
WEIGHT: 140 LBS | SYSTOLIC BLOOD PRESSURE: 140 MMHG | BODY MASS INDEX: 21.93 KG/M2 | DIASTOLIC BLOOD PRESSURE: 75 MMHG | OXYGEN SATURATION: 98 % | HEART RATE: 58 BPM

## 2024-09-09 DIAGNOSIS — E78.1 HIGH TRIGLYCERIDES: ICD-10-CM

## 2024-09-09 DIAGNOSIS — I50.30 HEART FAILURE WITH PRESERVED EJECTION FRACTION, UNSPECIFIED HF CHRONICITY (MULTI): Primary | ICD-10-CM

## 2024-09-09 DIAGNOSIS — Z95.1 S/P CABG (CORONARY ARTERY BYPASS GRAFT): ICD-10-CM

## 2024-09-09 DIAGNOSIS — I10 BENIGN ESSENTIAL HYPERTENSION: ICD-10-CM

## 2024-09-09 DIAGNOSIS — E78.00 PURE HYPERCHOLESTEROLEMIA: ICD-10-CM

## 2024-09-09 DIAGNOSIS — I25.10 CORONARY ARTERY DISEASE INVOLVING NATIVE CORONARY ARTERY, UNSPECIFIED WHETHER ANGINA PRESENT, UNSPECIFIED WHETHER NATIVE OR TRANSPLANTED HEART: ICD-10-CM

## 2024-09-09 DIAGNOSIS — I48.91 ATRIAL FIBRILLATION, UNSPECIFIED TYPE (MULTI): ICD-10-CM

## 2024-09-09 DIAGNOSIS — I10 BENIGN ESSENTIAL HTN: ICD-10-CM

## 2024-09-09 PROCEDURE — G2211 COMPLEX E/M VISIT ADD ON: HCPCS | Performed by: INTERNAL MEDICINE

## 2024-09-09 PROCEDURE — 1036F TOBACCO NON-USER: CPT | Performed by: INTERNAL MEDICINE

## 2024-09-09 PROCEDURE — 1160F RVW MEDS BY RX/DR IN RCRD: CPT | Performed by: INTERNAL MEDICINE

## 2024-09-09 PROCEDURE — 99214 OFFICE O/P EST MOD 30 MIN: CPT | Performed by: INTERNAL MEDICINE

## 2024-09-09 PROCEDURE — 3077F SYST BP >= 140 MM HG: CPT | Performed by: INTERNAL MEDICINE

## 2024-09-09 PROCEDURE — 1159F MED LIST DOCD IN RCRD: CPT | Performed by: INTERNAL MEDICINE

## 2024-09-09 PROCEDURE — 3078F DIAST BP <80 MM HG: CPT | Performed by: INTERNAL MEDICINE

## 2024-09-09 RX ORDER — LISINOPRIL 20 MG/1
20 TABLET ORAL DAILY
Qty: 90 TABLET | Refills: 3 | Status: SHIPPED | OUTPATIENT
Start: 2024-09-09 | End: 2025-09-09

## 2024-09-09 NOTE — PATIENT INSTRUCTIONS
Increase lisinopril to 20 mg daily for BP  BMP 1 week  Heart healthy diet=more plants th e better  Stay active

## 2024-09-09 NOTE — PROGRESS NOTES
Chief Complaint:   Follow-up (Patient is here for a follow up)     History Of Present Illness:    Josh Rehman Jr. is a 69 y.o. male presenting with cv dz.  Some claudication with excessive activity ie cutting grass  Patient denies chest pain/SOB/palpitations/dizziness/lightheadedness/edema  Active-total gym and light weights       Last Recorded Vitals:  Vitals:    09/09/24 1044 09/09/24 1059   BP: 168/83 140/75   BP Location: Left arm    Patient Position: Sitting    BP Cuff Size: Adult    Pulse: 58    SpO2: 98%    Weight: 63.5 kg (140 lb)             Allergies:  Cilostazol and Penicillins    Outpatient Medications:  Current Outpatient Medications   Medication Instructions    acetaminophen (Tylenol) 325 mg tablet 2 tablets, oral, As needed    allopurinol (ZYLOPRIM) 100 mg, oral, Daily    aspirin 81 mg, oral, Daily    atorvastatin (LIPITOR) 40 mg, oral, Daily    esomeprazole (NEXIUM) 20 mg, oral, Daily    folic acid (Folvite) 1 mg tablet 1 tablet, oral, Daily RT    lisinopril 10 mg, oral, Daily    metoprolol succinate XL (TOPROL-XL) 50 mg, oral, 2 times daily    multivitamin tablet 1 tablet, oral, Daily    omega 3-dha-epa-fish oil (Fish OiL) 300-1,000 mg capsule,delayed release(DR/EC) oral, Daily RT       Physical Exam:  Constitutional:       Appearance: Healthy appearance. Not in distress.   Neck:      Vascular: No JVR. JVD normal.   Pulmonary:      Effort: Pulmonary effort is normal.      Breath sounds: Normal breath sounds. No wheezing. No rhonchi. No rales.   Chest:      Chest wall: Not tender to palpatation.   Cardiovascular:      PMI at left midclavicular line. Normal rate. Regular rhythm. Normal S1. Normal S2.       Murmurs: There is no murmur.      No gallop.  No click. No rub.   Pulses:     Intact distal pulses.   Edema:     Peripheral edema absent.   Abdominal:      General: Bowel sounds are normal.      Palpations: Abdomen is soft.      Tenderness: There is no abdominal tenderness.   Musculoskeletal:  Normal range of motion.         General: No tenderness. Skin:     General: Skin is warm and dry.   Neurological:      General: No focal deficit present.      Mental Status: Alert and oriented to person, place and time.          Last Labs:  CBC -  Lab Results   Component Value Date    WBC 9.4 12/20/2022    HGB 9.5 (L) 12/20/2022    HCT 29.3 (L) 12/20/2022    MCV 99 12/20/2022     12/20/2022       CMP -  Lab Results   Component Value Date    CALCIUM 9.7 01/16/2023    PHOS 2.8 12/20/2022    PROT 3.5 (L) 12/13/2022    ALBUMIN 3.2 (L) 12/20/2022    AST 76 (H) 12/13/2022    ALT 20 12/13/2022    ALKPHOS 29 (L) 12/13/2022    BILITOT 0.7 12/13/2022       LIPID PANEL -   Lab Results   Component Value Date    CHOL 126 02/07/2024    TRIG 63 02/07/2024    HDL 64.5 02/07/2024    CHHDL 2.0 02/07/2024    LDLF 86 03/11/2023    VLDL 13 02/07/2024    NHDL 62 02/07/2024     Ldl=49  RENAL FUNCTION PANEL -   Lab Results   Component Value Date    GLUCOSE 110 (H) 01/16/2023     01/16/2023    K 4.8 01/16/2023     01/16/2023    CO2 30 01/16/2023    ANIONGAP 14 01/16/2023    BUN 17 01/16/2023    CREATININE 1.37 (H) 01/16/2023    GFRMALE 55 (A) 01/16/2023    CALCIUM 9.7 01/16/2023    PHOS 2.8 12/20/2022    ALBUMIN 3.2 (L) 12/20/2022        Lab Results   Component Value Date    HGBA1C 5.7 (H) 02/16/2024           Lab review: I have personally reviewed the laboratory result(s)       Problem List Items Addressed This Visit       Hyperlipidemia    Overview     On moderate intensity statin … 3/2023 LDL=86 - changed to HI statin    2/2024 LDL=49         Benign essential hypertension    Overview     BP elevated in office today-increase lisinopril  Recheck BMP 1 week         High triglycerides    Overview       Takes fish oil            (HFpEF) heart failure with preserved ejection fraction (Multi) - Primary    Overview     No overt signs of volume overload            Atrial fibrillation (Multi)    Overview     Transient postop  A-fib   In NSR today  On ASA  Does have #35 AtriClip in place         Coronary artery disease    Overview     11/2022 cath with MV CAD after abnormal stress test   12/13/2022 CABG: LIMA to LAD, sequela SVG to RCA / OM2, SVG to OM1   No current angina   On ASA / statin / beta blocker   Completed Cardiac Rehab   Follow         S/P CABG (coronary artery bypass graft)     Other Visit Diagnoses       Benign essential HTN                Increase lisinopril to 20 mg daily for BP  BMP 1 week  Herat healthy diet=more plants th e better  Stay active    Bobby Goldstein, DO

## 2024-09-25 ENCOUNTER — LAB (OUTPATIENT)
Dept: LAB | Facility: LAB | Age: 69
End: 2024-09-25
Payer: MEDICARE

## 2024-09-25 DIAGNOSIS — I10 BENIGN ESSENTIAL HTN: ICD-10-CM

## 2024-09-25 LAB
ANION GAP SERPL CALC-SCNC: 10 MMOL/L (ref 10–20)
BUN SERPL-MCNC: 29 MG/DL (ref 6–23)
CALCIUM SERPL-MCNC: 9.1 MG/DL (ref 8.6–10.3)
CHLORIDE SERPL-SCNC: 104 MMOL/L (ref 98–107)
CO2 SERPL-SCNC: 31 MMOL/L (ref 21–32)
CREAT SERPL-MCNC: 1.34 MG/DL (ref 0.5–1.3)
EGFRCR SERPLBLD CKD-EPI 2021: 57 ML/MIN/1.73M*2
GLUCOSE SERPL-MCNC: 93 MG/DL (ref 74–99)
POTASSIUM SERPL-SCNC: 4.4 MMOL/L (ref 3.5–5.3)
SODIUM SERPL-SCNC: 141 MMOL/L (ref 136–145)

## 2024-09-25 PROCEDURE — 80048 BASIC METABOLIC PNL TOTAL CA: CPT

## 2024-09-25 PROCEDURE — 36415 COLL VENOUS BLD VENIPUNCTURE: CPT

## 2024-11-19 ENCOUNTER — HOSPITAL ENCOUNTER (OUTPATIENT)
Dept: VASCULAR MEDICINE | Facility: CLINIC | Age: 69
Discharge: HOME | End: 2024-11-19
Payer: MEDICARE

## 2024-11-19 DIAGNOSIS — R09.89 OTHER SPECIFIED SYMPTOMS AND SIGNS INVOLVING THE CIRCULATORY AND RESPIRATORY SYSTEMS: ICD-10-CM

## 2024-11-19 DIAGNOSIS — I65.23 BILATERAL CAROTID ARTERY STENOSIS: ICD-10-CM

## 2024-11-19 PROCEDURE — 93880 EXTRACRANIAL BILAT STUDY: CPT

## 2024-11-19 PROCEDURE — 93880 EXTRACRANIAL BILAT STUDY: CPT | Performed by: SURGERY

## 2025-03-10 ENCOUNTER — OFFICE VISIT (OUTPATIENT)
Dept: CARDIOLOGY | Facility: CLINIC | Age: 70
End: 2025-03-10
Payer: MEDICARE

## 2025-03-10 VITALS
BODY MASS INDEX: 22.08 KG/M2 | DIASTOLIC BLOOD PRESSURE: 85 MMHG | SYSTOLIC BLOOD PRESSURE: 152 MMHG | HEART RATE: 59 BPM | OXYGEN SATURATION: 99 % | WEIGHT: 141 LBS

## 2025-03-10 DIAGNOSIS — Z95.1 S/P CABG (CORONARY ARTERY BYPASS GRAFT): ICD-10-CM

## 2025-03-10 DIAGNOSIS — I25.10 CORONARY ARTERY DISEASE INVOLVING NATIVE CORONARY ARTERY, UNSPECIFIED WHETHER ANGINA PRESENT, UNSPECIFIED WHETHER NATIVE OR TRANSPLANTED HEART: ICD-10-CM

## 2025-03-10 DIAGNOSIS — I48.91 ATRIAL FIBRILLATION, UNSPECIFIED TYPE (MULTI): ICD-10-CM

## 2025-03-10 DIAGNOSIS — E78.5 HYPERLIPIDEMIA, UNSPECIFIED HYPERLIPIDEMIA TYPE: ICD-10-CM

## 2025-03-10 DIAGNOSIS — I73.9 PAD (PERIPHERAL ARTERY DISEASE) (CMS-HCC): Primary | ICD-10-CM

## 2025-03-10 DIAGNOSIS — E78.00 PURE HYPERCHOLESTEROLEMIA: ICD-10-CM

## 2025-03-10 DIAGNOSIS — I50.30 HEART FAILURE WITH PRESERVED EJECTION FRACTION, UNSPECIFIED HF CHRONICITY: ICD-10-CM

## 2025-03-10 DIAGNOSIS — E78.1 HIGH TRIGLYCERIDES: ICD-10-CM

## 2025-03-10 DIAGNOSIS — I10 BENIGN ESSENTIAL HYPERTENSION: ICD-10-CM

## 2025-03-10 DIAGNOSIS — I10 BENIGN ESSENTIAL HTN: ICD-10-CM

## 2025-03-10 PROCEDURE — 3078F DIAST BP <80 MM HG: CPT | Performed by: INTERNAL MEDICINE

## 2025-03-10 PROCEDURE — 93005 ELECTROCARDIOGRAM TRACING: CPT | Performed by: INTERNAL MEDICINE

## 2025-03-10 PROCEDURE — 99214 OFFICE O/P EST MOD 30 MIN: CPT | Performed by: INTERNAL MEDICINE

## 2025-03-10 PROCEDURE — 3077F SYST BP >= 140 MM HG: CPT | Performed by: INTERNAL MEDICINE

## 2025-03-10 PROCEDURE — 1160F RVW MEDS BY RX/DR IN RCRD: CPT | Performed by: INTERNAL MEDICINE

## 2025-03-10 PROCEDURE — 1036F TOBACCO NON-USER: CPT | Performed by: INTERNAL MEDICINE

## 2025-03-10 PROCEDURE — 1159F MED LIST DOCD IN RCRD: CPT | Performed by: INTERNAL MEDICINE

## 2025-03-10 PROCEDURE — G2211 COMPLEX E/M VISIT ADD ON: HCPCS | Performed by: INTERNAL MEDICINE

## 2025-03-10 PROCEDURE — 93010 ELECTROCARDIOGRAM REPORT: CPT | Performed by: INTERNAL MEDICINE

## 2025-03-10 PROCEDURE — 99214 OFFICE O/P EST MOD 30 MIN: CPT | Mod: 25 | Performed by: INTERNAL MEDICINE

## 2025-03-10 RX ORDER — METOPROLOL SUCCINATE 50 MG/1
50 TABLET, EXTENDED RELEASE ORAL 2 TIMES DAILY
Qty: 180 TABLET | Refills: 3 | Status: SHIPPED | OUTPATIENT
Start: 2025-03-10 | End: 2026-03-10

## 2025-03-10 RX ORDER — LISINOPRIL 20 MG/1
20 TABLET ORAL DAILY
Qty: 90 TABLET | Refills: 3 | Status: SHIPPED | OUTPATIENT
Start: 2025-03-10 | End: 2026-03-10

## 2025-03-10 RX ORDER — ALLOPURINOL 100 MG/1
100 TABLET ORAL DAILY
Qty: 90 TABLET | Refills: 3 | Status: SHIPPED | OUTPATIENT
Start: 2025-03-10 | End: 2026-03-10

## 2025-03-10 RX ORDER — ATORVASTATIN CALCIUM 40 MG/1
40 TABLET, FILM COATED ORAL DAILY
Qty: 90 TABLET | Refills: 3 | Status: SHIPPED | OUTPATIENT
Start: 2025-03-10

## 2025-03-10 NOTE — PROGRESS NOTES
Chief Complaint:   Heart Failure, Hypertension, Atrial Fibrillation, and Coronary Artery Disease     History Of Present Illness:    Josh Rehman Jr. is a 70 y.o. male presenting with cv dz.  Legs get tired  Patient denies chest pain/SOB/palpitations/dizziness/lightheadedness/edema  Active-cardio and light weights       Last Recorded Vitals:  Vitals:    03/10/25 1110 03/10/25 1128   BP: 140/78 152/85   BP Location: Left arm    Patient Position: Sitting    BP Cuff Size: Adult    Pulse: 59    SpO2: 99%    Weight: 64 kg (141 lb)             Allergies:  Cilostazol and Penicillins    Outpatient Medications:  Current Outpatient Medications   Medication Instructions    acetaminophen (Tylenol) 325 mg tablet 2 tablets, As needed    allopurinol (ZYLOPRIM) 100 mg, Daily    aspirin 81 mg, Daily    atorvastatin (LIPITOR) 40 mg, oral, Daily    esomeprazole (NEXIUM) 20 mg, Daily    folic acid (Folvite) 1 mg tablet 1 tablet, Daily RT    lisinopril 20 mg, oral, Daily    metoprolol succinate XL (TOPROL-XL) 50 mg, 2 times daily    multivitamin tablet 1 tablet, Daily    omega 3-dha-epa-fish oil (Fish OiL) 300-1,000 mg capsule,delayed release(DR/EC) Daily RT       Physical Exam:  Constitutional:       Appearance: Healthy appearance. Not in distress.   Neck:      Vascular: No JVR. JVD normal.   Pulmonary:      Effort: Pulmonary effort is normal.      Breath sounds: Normal breath sounds. No wheezing. No rhonchi. No rales.   Chest:      Chest wall: Not tender to palpatation.   Cardiovascular:      PMI at left midclavicular line. Normal rate. Regular rhythm. Normal S1. Normal S2.       Murmurs: There is no murmur.      No gallop.  No click. No rub.   Pulses:     Intact distal pulses.   Edema:     Peripheral edema absent.   Abdominal:      General: Bowel sounds are normal.      Palpations: Abdomen is soft.      Tenderness: There is no abdominal tenderness.   Musculoskeletal: Normal range of motion.         General: No tenderness. Skin:      General: Skin is warm and dry.   Neurological:      General: No focal deficit present.      Mental Status: Alert and oriented to person, place and time.          Last Labs:  CBC -  Lab Results   Component Value Date    WBC 9.4 12/20/2022    HGB 9.5 (L) 12/20/2022    HCT 29.3 (L) 12/20/2022    MCV 99 12/20/2022     12/20/2022       CMP -  Lab Results   Component Value Date    CALCIUM 9.1 09/25/2024    PHOS 2.8 12/20/2022    PROT 3.5 (L) 12/13/2022    ALBUMIN 3.2 (L) 12/20/2022    AST 76 (H) 12/13/2022    ALT 20 12/13/2022    ALKPHOS 29 (L) 12/13/2022    BILITOT 0.7 12/13/2022       LIPID PANEL -   Lab Results   Component Value Date    CHOL 126 02/07/2024    TRIG 63 02/07/2024    HDL 64.5 02/07/2024    CHHDL 2.0 02/07/2024    LDLF 86 03/11/2023    VLDL 13 02/07/2024    NHDL 62 02/07/2024     Ldl=49  RENAL FUNCTION PANEL -   Lab Results   Component Value Date    GLUCOSE 93 09/25/2024     09/25/2024    K 4.4 09/25/2024     09/25/2024    CO2 31 09/25/2024    ANIONGAP 10 09/25/2024    BUN 29 (H) 09/25/2024    CREATININE 1.34 (H) 09/25/2024    GFRMALE 55 (A) 01/16/2023    CALCIUM 9.1 09/25/2024    PHOS 2.8 12/20/2022    ALBUMIN 3.2 (L) 12/20/2022        Lab Results   Component Value Date    HGBA1C 5.7 (H) 02/16/2024           Lab review: I have personally reviewed the laboratory result(s)       Problem List Items Addressed This Visit       PAD (peripheral artery disease) (CMS-[facility]) - Primary    Overview     s/p 11/2019 L-R fem / fem BPG   Some current claudication   Mod LLE dz per 12/2023 PVR   On ASA/ statin  Follow         Hyperlipidemia    Overview     On moderate intensity statin … 3/2023 LDL=86 - changed to HI statin    2/2024 LDL=49  Recheck         Benign essential hypertension    Overview     BP elevated in office today but generally lower at home thus no med changes  9/2024 BMP OK         High triglycerides    Overview       Takes fish oil            (HFpEF) heart failure with preserved ejection  fraction    Overview     No overt signs of volume overload            Atrial fibrillation (Multi)    Overview     Transient postop A-fib   In NSR today  On ASA  Does have #35 AtriClip in place         Coronary artery disease    Overview     11/2022 cath with MV CAD after abnormal stress test   12/13/2022 CABG: LIMA to LAD, sequela SVG to RCA / OM2, SVG to OM1   No current angina /ischemic EKG changes  On ASA / statin / beta blocker   Completed Cardiac Rehab   Follow         S/P CABG (coronary artery bypass graft)     Other Visit Diagnoses       Benign essential HTN        Relevant Orders    ECG 12 lead (Clinic Performed)            Heart healthy diet=more plants th e better  lipids  Bobby Goldstein, DO

## 2025-03-12 LAB
CHOLEST SERPL-MCNC: 134 MG/DL
CHOLEST/HDLC SERPL: 2 (CALC)
HDLC SERPL-MCNC: 66 MG/DL
LDLC SERPL CALC-MCNC: 53 MG/DL (CALC)
NONHDLC SERPL-MCNC: 68 MG/DL (CALC)
TRIGL SERPL-MCNC: 69 MG/DL

## 2025-03-14 LAB
ATRIAL RATE: 59 BPM
P AXIS: 70 DEGREES
P OFFSET: 200 MS
P ONSET: 144 MS
PR INTERVAL: 142 MS
Q ONSET: 215 MS
QRS COUNT: 9 BEATS
QRS DURATION: 82 MS
QT INTERVAL: 426 MS
QTC CALCULATION(BAZETT): 421 MS
QTC FREDERICIA: 423 MS
R AXIS: 39 DEGREES
T AXIS: 102 DEGREES
T OFFSET: 428 MS
VENTRICULAR RATE: 59 BPM

## 2025-06-11 ENCOUNTER — HOSPITAL ENCOUNTER (OUTPATIENT)
Dept: VASCULAR MEDICINE | Facility: CLINIC | Age: 70
Discharge: HOME | End: 2025-06-11
Payer: MEDICARE

## 2025-06-11 DIAGNOSIS — Z95.828 S/P BYPASS GRAFT OF EXTREMITY: ICD-10-CM

## 2025-06-11 DIAGNOSIS — I73.9 PAD (PERIPHERAL ARTERY DISEASE): ICD-10-CM

## 2025-06-11 PROCEDURE — 93925 LOWER EXTREMITY STUDY: CPT | Performed by: INTERNAL MEDICINE

## 2025-06-11 PROCEDURE — 93925 LOWER EXTREMITY STUDY: CPT

## 2025-06-11 PROCEDURE — 93922 UPR/L XTREMITY ART 2 LEVELS: CPT | Performed by: INTERNAL MEDICINE

## 2025-06-11 PROCEDURE — 93922 UPR/L XTREMITY ART 2 LEVELS: CPT

## 2025-06-17 ENCOUNTER — APPOINTMENT (OUTPATIENT)
Dept: VASCULAR SURGERY | Facility: CLINIC | Age: 70
End: 2025-06-17
Payer: MEDICARE

## 2025-06-17 VITALS
WEIGHT: 137 LBS | DIASTOLIC BLOOD PRESSURE: 74 MMHG | OXYGEN SATURATION: 100 % | SYSTOLIC BLOOD PRESSURE: 122 MMHG | HEIGHT: 67 IN | BODY MASS INDEX: 21.5 KG/M2 | HEART RATE: 59 BPM

## 2025-06-17 DIAGNOSIS — I73.9 CLAUDICATION: ICD-10-CM

## 2025-06-17 DIAGNOSIS — I99.8 OTHER DISORDER OF CIRCULATORY SYSTEM: ICD-10-CM

## 2025-06-17 DIAGNOSIS — I73.9 PAD (PERIPHERAL ARTERY DISEASE): Primary | ICD-10-CM

## 2025-06-17 DIAGNOSIS — Z95.828 S/P BYPASS GRAFT OF EXTREMITY: ICD-10-CM

## 2025-06-17 PROCEDURE — 1160F RVW MEDS BY RX/DR IN RCRD: CPT | Performed by: SURGERY

## 2025-06-17 PROCEDURE — 1036F TOBACCO NON-USER: CPT | Performed by: SURGERY

## 2025-06-17 PROCEDURE — 3078F DIAST BP <80 MM HG: CPT | Performed by: SURGERY

## 2025-06-17 PROCEDURE — 99214 OFFICE O/P EST MOD 30 MIN: CPT | Performed by: SURGERY

## 2025-06-17 PROCEDURE — 1159F MED LIST DOCD IN RCRD: CPT | Performed by: SURGERY

## 2025-06-17 PROCEDURE — 3008F BODY MASS INDEX DOCD: CPT | Performed by: SURGERY

## 2025-06-17 PROCEDURE — 3074F SYST BP LT 130 MM HG: CPT | Performed by: SURGERY

## 2025-06-17 ASSESSMENT — ENCOUNTER SYMPTOMS
HEMATOLOGIC/LYMPHATIC NEGATIVE: 1
CONSTITUTIONAL NEGATIVE: 1
RESPIRATORY NEGATIVE: 1
CARDIOVASCULAR NEGATIVE: 1
MUSCULOSKELETAL NEGATIVE: 1
PSYCHIATRIC NEGATIVE: 1
ALLERGIC/IMMUNOLOGIC NEGATIVE: 1
ENDOCRINE NEGATIVE: 1
GASTROINTESTINAL NEGATIVE: 1
NEUROLOGICAL NEGATIVE: 1

## 2025-06-17 NOTE — H&P (VIEW-ONLY)
History Of Present Illness  Josh Rehman Jr. is a 70 y.o. male presenting for PAD follow-up.  He has history of left to right cross femoral bypass in 2019.  His last visit was 1 year ago.  He notes increased left calf claudication at shorter distances since his last.  He states this is starting to affect his quality of life.  Recent CAROLA/PVR study reveals ABIs of 0.94 on the right and 0.64 on the left.  This is a slight decrease on the left when compared to the prior study.  His surveillance arterial duplex does suggest a greater than 50% stenosis left superficial femoral and arteries.     Past Medical History  He has a past medical history of Personal history of other diseases of the circulatory system (11/15/2022), Personal history of other diseases of the circulatory system (11/15/2022), Personal history of other diseases of the circulatory system (11/15/2022), Personal history of other diseases of the circulatory system (11/15/2022), and Personal history of other endocrine, nutritional and metabolic disease (11/15/2022).    Surgical History  He has a past surgical history that includes Other surgical history (05/30/2019); Other surgical history (05/30/2019); Other surgical history (11/26/2019); Other surgical history (01/05/2023); Other surgical history (11/21/2022); Other surgical history (06/22/2021); CT angio aorta and bilateral iliofemoral runoff including without contrast if performed (5/6/2019); and CT angio coronary art with heartflow if score >30% (8/19/2022).     Social History  He reports that he quit smoking about 2 years ago. His smoking use included cigarettes. He has never used smokeless tobacco. No history on file for alcohol use and drug use.    Family History  Family History[1]     Allergies  Cilostazol and Penicillins    Review of Systems   Constitutional: Negative.    HENT: Negative.     Respiratory: Negative.     Cardiovascular: Negative.         Claudication   Gastrointestinal: Negative.   "  Endocrine: Negative.    Genitourinary: Negative.    Musculoskeletal: Negative.    Allergic/Immunologic: Negative.    Neurological: Negative.    Hematological: Negative.    Psychiatric/Behavioral: Negative.          Physical Exam  Vitals reviewed.   Constitutional:       General: He is not in acute distress.     Appearance: Normal appearance. He is normal weight.   HENT:      Head: Normocephalic and atraumatic.   Eyes:      Extraocular Movements: Extraocular movements intact.      Conjunctiva/sclera: Conjunctivae normal.   Cardiovascular:      Rate and Rhythm: Normal rate and regular rhythm.      Pulses:           Femoral pulses are 2+ on the right side and 2+ on the left side.       Dorsalis pedis pulses are 1+ on the right side and detected w/ Doppler on the left side.        Posterior tibial pulses are detected w/ Doppler on the right side and detected w/ Doppler on the left side.      Heart sounds: Normal heart sounds.   Pulmonary:      Effort: Pulmonary effort is normal.      Breath sounds: Normal breath sounds.   Abdominal:      General: Abdomen is flat.      Palpations: Abdomen is soft.   Musculoskeletal:         General: No swelling or tenderness. Normal range of motion.      Cervical back: Normal range of motion.   Skin:     General: Skin is warm and dry.      Capillary Refill: Capillary refill takes less than 2 seconds.   Neurological:      General: No focal deficit present.      Mental Status: He is alert and oriented to person, place, and time.      Cranial Nerves: No cranial nerve deficit.      Sensory: No sensory deficit.      Motor: No weakness.   Psychiatric:         Mood and Affect: Mood normal.         Behavior: Behavior normal.          Last Recorded Vitals  Blood pressure 122/74, pulse 59, height 1.702 m (5' 7\"), weight 62.1 kg (137 lb), SpO2 100%.    Relevant Results    Current Medications[2]       CAROLA without exercise  Result Date: 6/12/2025           [facility], Vascular " Lab [address], [place] [address]             Tel [phone] and Fax [phone]  Vascular Lab Report [facility] US ANKLE BRACHIAL INDEX (CAROLA) WITHOUT EXERCISE  Patient Name:      ESTEFANI Reed Physician:  [id number] Irish Stewart MD Study Date:        6/11/2025           Ordering Physician: [id number] QUANG MCCULLOUGH MRN/PID:           [id number]            Technologist:       Lauro Veliz RVT, [facility] Accession#:        [id number]        Technologist 2: Date of Birth/Age: 1955 / 70 years Encounter#:         [id number] Gender:            M Admission Status:  Outpatient          Location Performed: [facility]  Diagnosis/ICD: Peripheral vascular disease, unspecified-I73.9 CPT Codes:     04081 Peripheral artery CAROLA Only  CONCLUSIONS: Right Lower PVR: No evidence of arterial occlusive disease in the right lower extremity at rest. Decreased digital perfusion noted. Multiphasic flow is noted in the right common femoral artery, right posterior tibial artery and right dorsalis pedis artery. Left Lower PVR: Evidence of moderate arterial occlusive disease in the left lower extremity at rest. Decreased digital perfusion noted. Monophasic flow is noted in the left posterior tibial artery and left dorsalis pedis artery. Multiphasic flow is noted in the left common femoral artery.  Comparison: Compared with study from 6/10/2024, The left leg demonstrates moderate disease in today's exam from mild disease.  Imaging & Doppler Findings:  RIGHT Lower PVR                Pressures Ratios Right Posterior Tibial (Ankle) 135 mmHg  0.94 Right Dorsalis Pedis (Ankle)   124 mmHg  0.87 Right Digit (Great Toe)        80 mmHg   0.56   LEFT Lower PVR                 Pressures Ratios Left Posterior Tibial (Ankle) 91 mmHg   0.64 Left Dorsalis Pedis (Ankle)   76 mmHg   0.53 Left Digit (Great Toe)        62 mmHg   0.43                     Right     Left Brachial Pressure 143 mmHg 140 mmHg   [id number] Irish Stewart MD Electronically signed by [id number] Irish Stewart MD on 6/12/2025 at 12:02:24 PM  ** Final **     Vascular US lower extremity arterial duplex bilateral  Result Date: 6/11/2025           [facility], Vascular Lab [address], [place] [address]             Tel [phone] and Fax [phone]  Vascular Lab Report [facility] US LOWER EXTREMITY ARTERIAL DUPLEX BILATERAL  Patient Name:      ESTEFANI DURAN       Reading Physician:  [id number] Irish Stewart MD Study Date:        6/11/2025           Ordering Physician: [id number] MCCULLOUGH MRN/PID:           [id number]            Technologist:       Lauro Hays RVT [facility] Accession#:        [id number]        Technologist 2: Date of Birth/Age: 1955 / 70 years Encounter#:         [id number] Gender:            M Admission Status:  Outpatient          Location Performed: [facility]  Diagnosis/ICD: Peripheral vascular disease, unspecified-I73.9 CPT Codes:     92158 Peripheral artery Lower arterial Duplex complete  CONCLUSIONS: Left Lower Arterial: There is >50% stenosis documented at the superficial femoral artery proximal and profunda artery. Left Bypass Graft: The left to right femoral-femoral bypass graft appears widely patent.  Imaging & Doppler Findings:  Bypass Graft Surveillance: Inflow Artery  144 cm/s Prox Anast     125 cm/s Prox Graft     45 cm/s Mid Graft      39 cm/s Distal Graft   34 cm/s Distal Anast   38 cm/s Outflow Artery 53 cm/s  Right                     Left  PSV                       PSV       Profunda Proximal 286 cm/s         SFA Proximal    352 cm/s  [id number] Irish Stewart MD Electronically signed by [id number] Irish Stewart MD on 6/11/2025 at 10:35:07 PM  ** Final **           Assessment/Plan   Diagnoses and all orders for this visit:  PAD (peripheral artery disease)  -     Coagulation Screen; Future  -     Case Request Operating Room: Lower Extremity Angiogram; Standing  -     CBC; Future  -     Basic Metabolic Panel; Future  Claudication  -     Coagulation Screen; Future  -     Case Request Operating Room: Lower Extremity Angiogram; Standing  -     CBC; Future  -     Basic Metabolic Panel; Future  S/P bypass graft of extremity  -     Coagulation Screen; Future  -     Case Request Operating Room: Lower Extremity Angiogram; Standing  -     CBC; Future  -     Basic Metabolic Panel; Future  Other disorder of circulatory system  -     Coagulation Screen; Future  Other orders  -     NPO Diet Except: Sips with meds; Effective midnight; Standing  -     Height and weight; Standing  -     Insert and maintain peripheral IV; Standing  -     Saline lock IV; Standing  -     Vital Signs; Standing  -     Pulse oximetry, spot; Standing  -     Place in outpatient/hospital ambulatory surgery; Standing  -     Full code; Standing      69yo male with known PAD and history of right to left cross femoral bypass.  He does report increasing left leg claudication symptoms since his last visit.  There is also noted decrease in his CAROLA on the left and stenosis is noted in the left superficial and profunda femoris arteries on arterial duplex.  This does warrant further intervention at this time.  I have discussed proceeding with angiogram with intervention.  We have discussed the details of the procedure including benefits, risk, and alternatives.  He would like to proceed.  Will schedule the procedure in [facility].       (This note was generated with voice recognition software and may contain  errors including spelling, grammar, syntax and missed recognition of what was dictated, of which may not have been fully corrected)        Samia Bautista MD          [1] No family history on file.  [2]   Current Outpatient Medications:     acetaminophen (Tylenol) 325 mg tablet, Take 2 tablets (650 mg) by mouth if needed., Disp: , Rfl:     allopurinol (Zyloprim) 100 mg tablet, Take 1 tablet (100 mg) by mouth once daily., Disp: 90 tablet, Rfl: 3    aspirin 81 mg EC tablet, Take 1 tablet (81 mg) by mouth once daily., Disp: , Rfl:     atorvastatin (Lipitor) 40 mg tablet, Take 1 tablet (40 mg) by mouth once daily., Disp: 90 tablet, Rfl: 3    esomeprazole (NexIUM) 20 mg DR capsule, Take 1 capsule (20 mg) by mouth once daily., Disp: , Rfl:     folic acid (Folvite) 1 mg tablet, Take 1 tablet (1 mg) by mouth once daily., Disp: , Rfl:     lisinopril 20 mg tablet, Take 1 tablet (20 mg) by mouth once daily., Disp: 90 tablet, Rfl: 3    metoprolol succinate XL (Toprol-XL) 50 mg 24 hr tablet, Take 1 tablet (50 mg) by mouth 2 times a day., Disp: 180 tablet, Rfl: 3    multivitamin tablet, Take 1 tablet by mouth once daily., Disp: , Rfl:     omega 3-dha-epa-fish oil (Fish OiL) 300-1,000 mg capsule,delayed release(DR/EC), Take by mouth once daily., Disp: , Rfl:

## 2025-06-17 NOTE — PROGRESS NOTES
History Of Present Illness  Josh Rehman Jr. is a 70 y.o. male presenting for PAD follow-up.  He has history of left to right cross femoral bypass in 2019.  His last visit was 1 year ago.  He notes increased left calf claudication at shorter distances since his last.  He states this is starting to affect his quality of life.  Recent CAROLA/PVR study reveals ABIs of 0.94 on the right and 0.64 on the left.  This is a slight decrease on the left when compared to the prior study.  His surveillance arterial duplex does suggest a greater than 50% stenosis left superficial femoral and arteries.     Past Medical History  He has a past medical history of Personal history of other diseases of the circulatory system (11/15/2022), Personal history of other diseases of the circulatory system (11/15/2022), Personal history of other diseases of the circulatory system (11/15/2022), Personal history of other diseases of the circulatory system (11/15/2022), and Personal history of other endocrine, nutritional and metabolic disease (11/15/2022).    Surgical History  He has a past surgical history that includes Other surgical history (05/30/2019); Other surgical history (05/30/2019); Other surgical history (11/26/2019); Other surgical history (01/05/2023); Other surgical history (11/21/2022); Other surgical history (06/22/2021); CT angio aorta and bilateral iliofemoral runoff including without contrast if performed (5/6/2019); and CT angio coronary art with heartflow if score >30% (8/19/2022).     Social History  He reports that he quit smoking about 2 years ago. His smoking use included cigarettes. He has never used smokeless tobacco. No history on file for alcohol use and drug use.    Family History  Family History[1]     Allergies  Cilostazol and Penicillins    Review of Systems   Constitutional: Negative.    HENT: Negative.     Respiratory: Negative.     Cardiovascular: Negative.         Claudication   Gastrointestinal: Negative.   "  Endocrine: Negative.    Genitourinary: Negative.    Musculoskeletal: Negative.    Allergic/Immunologic: Negative.    Neurological: Negative.    Hematological: Negative.    Psychiatric/Behavioral: Negative.          Physical Exam  Vitals reviewed.   Constitutional:       General: He is not in acute distress.     Appearance: Normal appearance. He is normal weight.   HENT:      Head: Normocephalic and atraumatic.   Eyes:      Extraocular Movements: Extraocular movements intact.      Conjunctiva/sclera: Conjunctivae normal.   Cardiovascular:      Rate and Rhythm: Normal rate and regular rhythm.      Pulses:           Femoral pulses are 2+ on the right side and 2+ on the left side.       Dorsalis pedis pulses are 1+ on the right side and detected w/ Doppler on the left side.        Posterior tibial pulses are detected w/ Doppler on the right side and detected w/ Doppler on the left side.      Heart sounds: Normal heart sounds.   Pulmonary:      Effort: Pulmonary effort is normal.      Breath sounds: Normal breath sounds.   Abdominal:      General: Abdomen is flat.      Palpations: Abdomen is soft.   Musculoskeletal:         General: No swelling or tenderness. Normal range of motion.      Cervical back: Normal range of motion.   Skin:     General: Skin is warm and dry.      Capillary Refill: Capillary refill takes less than 2 seconds.   Neurological:      General: No focal deficit present.      Mental Status: He is alert and oriented to person, place, and time.      Cranial Nerves: No cranial nerve deficit.      Sensory: No sensory deficit.      Motor: No weakness.   Psychiatric:         Mood and Affect: Mood normal.         Behavior: Behavior normal.          Last Recorded Vitals  Blood pressure 122/74, pulse 59, height 1.702 m (5' 7\"), weight 62.1 kg (137 lb), SpO2 100%.    Relevant Results    Current Medications[2]       CAROLA without exercise  Result Date: 6/12/2025           [facility], Vascular " Lab [address], [place] [address]             Tel [phone] and Fax [phone]  Vascular Lab Report [facility] US ANKLE BRACHIAL INDEX (CAROLA) WITHOUT EXERCISE  Patient Name:      ESTEFANI Reed Physician:  [id number] Irish Stewart MD Study Date:        6/11/2025           Ordering Physician: [id number] QUANG MCCULLOUGH MRN/PID:           [id number]            Technologist:       Lauro Veliz RVT, [facility] Accession#:        [id number]        Technologist 2: Date of Birth/Age: 1955 / 70 years Encounter#:         [id number] Gender:            M Admission Status:  Outpatient          Location Performed: [facility]  Diagnosis/ICD: Peripheral vascular disease, unspecified-I73.9 CPT Codes:     79143 Peripheral artery CAROLA Only  CONCLUSIONS: Right Lower PVR: No evidence of arterial occlusive disease in the right lower extremity at rest. Decreased digital perfusion noted. Multiphasic flow is noted in the right common femoral artery, right posterior tibial artery and right dorsalis pedis artery. Left Lower PVR: Evidence of moderate arterial occlusive disease in the left lower extremity at rest. Decreased digital perfusion noted. Monophasic flow is noted in the left posterior tibial artery and left dorsalis pedis artery. Multiphasic flow is noted in the left common femoral artery.  Comparison: Compared with study from 6/10/2024, The left leg demonstrates moderate disease in today's exam from mild disease.  Imaging & Doppler Findings:  RIGHT Lower PVR                Pressures Ratios Right Posterior Tibial (Ankle) 135 mmHg  0.94 Right Dorsalis Pedis (Ankle)   124 mmHg  0.87 Right Digit (Great Toe)        80 mmHg   0.56   LEFT Lower PVR                 Pressures Ratios Left Posterior Tibial (Ankle) 91 mmHg   0.64 Left Dorsalis Pedis (Ankle)   76 mmHg   0.53 Left Digit (Great Toe)        62 mmHg   0.43                     Right     Left Brachial Pressure 143 mmHg 140 mmHg   [id number] Irish Stewart MD Electronically signed by [id number] Irish Stewart MD on 6/12/2025 at 12:02:24 PM  ** Final **     Vascular US lower extremity arterial duplex bilateral  Result Date: 6/11/2025           [facility], Vascular Lab [address], [place] [address]             Tel [phone] and Fax [phone]  Vascular Lab Report [facility] US LOWER EXTREMITY ARTERIAL DUPLEX BILATERAL  Patient Name:      ESTEFANI DURAN       Reading Physician:  [id number] Irish Stewart MD Study Date:        6/11/2025           Ordering Physician: [id number] MCCULLOUGH MRN/PID:           [id number]            Technologist:       Lauro Hays RVT [facility] Accession#:        [id number]        Technologist 2: Date of Birth/Age: 1955 / 70 years Encounter#:         [id number] Gender:            M Admission Status:  Outpatient          Location Performed: [facility]  Diagnosis/ICD: Peripheral vascular disease, unspecified-I73.9 CPT Codes:     51250 Peripheral artery Lower arterial Duplex complete  CONCLUSIONS: Left Lower Arterial: There is >50% stenosis documented at the superficial femoral artery proximal and profunda artery. Left Bypass Graft: The left to right femoral-femoral bypass graft appears widely patent.  Imaging & Doppler Findings:  Bypass Graft Surveillance: Inflow Artery  144 cm/s Prox Anast     125 cm/s Prox Graft     45 cm/s Mid Graft      39 cm/s Distal Graft   34 cm/s Distal Anast   38 cm/s Outflow Artery 53 cm/s  Right                     Left  PSV                       PSV       Profunda Proximal 286 cm/s         SFA Proximal    352 cm/s  [id number] Irish Stewart MD Electronically signed by [id number] Irish Stewart MD on 6/11/2025 at 10:35:07 PM  ** Final **           Assessment/Plan   Diagnoses and all orders for this visit:  PAD (peripheral artery disease)  -     Coagulation Screen; Future  -     Case Request Operating Room: Lower Extremity Angiogram; Standing  -     CBC; Future  -     Basic Metabolic Panel; Future  Claudication  -     Coagulation Screen; Future  -     Case Request Operating Room: Lower Extremity Angiogram; Standing  -     CBC; Future  -     Basic Metabolic Panel; Future  S/P bypass graft of extremity  -     Coagulation Screen; Future  -     Case Request Operating Room: Lower Extremity Angiogram; Standing  -     CBC; Future  -     Basic Metabolic Panel; Future  Other disorder of circulatory system  -     Coagulation Screen; Future  Other orders  -     NPO Diet Except: Sips with meds; Effective midnight; Standing  -     Height and weight; Standing  -     Insert and maintain peripheral IV; Standing  -     Saline lock IV; Standing  -     Vital Signs; Standing  -     Pulse oximetry, spot; Standing  -     Place in outpatient/hospital ambulatory surgery; Standing  -     Full code; Standing      71yo male with known PAD and history of right to left cross femoral bypass.  He does report increasing left leg claudication symptoms since his last visit.  There is also noted decrease in his CAROLA on the left and stenosis is noted in the left superficial and profunda femoris arteries on arterial duplex.  This does warrant further intervention at this time.  I have discussed proceeding with angiogram with intervention.  We have discussed the details of the procedure including benefits, risk, and alternatives.  He would like to proceed.  Will schedule the procedure in [facility].       (This note was generated with voice recognition software and may contain  errors including spelling, grammar, syntax and missed recognition of what was dictated, of which may not have been fully corrected)        Samia Bautista MD          [1] No family history on file.  [2]   Current Outpatient Medications:     acetaminophen (Tylenol) 325 mg tablet, Take 2 tablets (650 mg) by mouth if needed., Disp: , Rfl:     allopurinol (Zyloprim) 100 mg tablet, Take 1 tablet (100 mg) by mouth once daily., Disp: 90 tablet, Rfl: 3    aspirin 81 mg EC tablet, Take 1 tablet (81 mg) by mouth once daily., Disp: , Rfl:     atorvastatin (Lipitor) 40 mg tablet, Take 1 tablet (40 mg) by mouth once daily., Disp: 90 tablet, Rfl: 3    esomeprazole (NexIUM) 20 mg DR capsule, Take 1 capsule (20 mg) by mouth once daily., Disp: , Rfl:     folic acid (Folvite) 1 mg tablet, Take 1 tablet (1 mg) by mouth once daily., Disp: , Rfl:     lisinopril 20 mg tablet, Take 1 tablet (20 mg) by mouth once daily., Disp: 90 tablet, Rfl: 3    metoprolol succinate XL (Toprol-XL) 50 mg 24 hr tablet, Take 1 tablet (50 mg) by mouth 2 times a day., Disp: 180 tablet, Rfl: 3    multivitamin tablet, Take 1 tablet by mouth once daily., Disp: , Rfl:     omega 3-dha-epa-fish oil (Fish OiL) 300-1,000 mg capsule,delayed release(DR/EC), Take by mouth once daily., Disp: , Rfl:

## 2025-06-27 ENCOUNTER — HOSPITAL ENCOUNTER (OUTPATIENT)
Facility: HOSPITAL | Age: 70
Setting detail: OUTPATIENT SURGERY
Discharge: HOME | End: 2025-06-27
Attending: SURGERY | Admitting: SURGERY
Payer: MEDICARE

## 2025-06-27 VITALS
HEIGHT: 67 IN | TEMPERATURE: 97.3 F | RESPIRATION RATE: 16 BRPM | WEIGHT: 132.58 LBS | SYSTOLIC BLOOD PRESSURE: 185 MMHG | OXYGEN SATURATION: 98 % | BODY MASS INDEX: 20.81 KG/M2 | HEART RATE: 65 BPM | DIASTOLIC BLOOD PRESSURE: 86 MMHG

## 2025-06-27 DIAGNOSIS — I73.9 CLAUDICATION: ICD-10-CM

## 2025-06-27 DIAGNOSIS — Z95.828 S/P BYPASS GRAFT OF EXTREMITY: ICD-10-CM

## 2025-06-27 DIAGNOSIS — I73.9 PAD (PERIPHERAL ARTERY DISEASE): Primary | ICD-10-CM

## 2025-06-27 LAB
ACT BLD: 298 SEC (ref 83–199)
ANION GAP SERPL CALC-SCNC: 14 MMOL/L (ref 10–20)
BUN SERPL-MCNC: 21 MG/DL (ref 6–23)
CALCIUM SERPL-MCNC: 9.9 MG/DL (ref 8.6–10.3)
CHLORIDE SERPL-SCNC: 102 MMOL/L (ref 98–107)
CO2 SERPL-SCNC: 28 MMOL/L (ref 21–32)
CREAT SERPL-MCNC: 1.24 MG/DL (ref 0.5–1.3)
EGFRCR SERPLBLD CKD-EPI 2021: 62 ML/MIN/1.73M*2
ERYTHROCYTE [DISTWIDTH] IN BLOOD BY AUTOMATED COUNT: 13.7 % (ref 11.5–14.5)
GLUCOSE SERPL-MCNC: 100 MG/DL (ref 74–99)
HCT VFR BLD AUTO: 46.2 % (ref 41–52)
HGB BLD-MCNC: 15.3 G/DL (ref 13.5–17.5)
INR PPP: 1 (ref 0.9–1.1)
MCH RBC QN AUTO: 30.9 PG (ref 26–34)
MCHC RBC AUTO-ENTMCNC: 33.1 G/DL (ref 32–36)
MCV RBC AUTO: 93 FL (ref 80–100)
NRBC BLD-RTO: 0 /100 WBCS (ref 0–0)
PLATELET # BLD AUTO: 234 X10*3/UL (ref 150–450)
POTASSIUM SERPL-SCNC: 4.5 MMOL/L (ref 3.5–5.3)
PROTHROMBIN TIME: 11 SECONDS (ref 9.8–12.4)
RBC # BLD AUTO: 4.95 X10*6/UL (ref 4.5–5.9)
SODIUM SERPL-SCNC: 139 MMOL/L (ref 136–145)
WBC # BLD AUTO: 9.1 X10*3/UL (ref 4.4–11.3)

## 2025-06-27 PROCEDURE — 85347 COAGULATION TIME ACTIVATED: CPT

## 2025-06-27 PROCEDURE — C1894 INTRO/SHEATH, NON-LASER: HCPCS | Performed by: SURGERY

## 2025-06-27 PROCEDURE — 7100000010 HC PHASE TWO TIME - EACH INCREMENTAL 1 MINUTE: Performed by: SURGERY

## 2025-06-27 PROCEDURE — 2500000004 HC RX 250 GENERAL PHARMACY W/ HCPCS (ALT 636 FOR OP/ED): Performed by: SURGERY

## 2025-06-27 PROCEDURE — C1887 CATHETER, GUIDING: HCPCS | Performed by: SURGERY

## 2025-06-27 PROCEDURE — 75710 ARTERY X-RAYS ARM/LEG: CPT | Performed by: SURGERY

## 2025-06-27 PROCEDURE — 96373 THER/PROPH/DIAG INJ IA: CPT | Performed by: SURGERY

## 2025-06-27 PROCEDURE — 36140 INTRO NDL ICATH UPR/LXTR ART: CPT | Mod: LT | Performed by: SURGERY

## 2025-06-27 PROCEDURE — 2720000007 HC OR 272 NO HCPCS: Performed by: SURGERY

## 2025-06-27 PROCEDURE — 76937 US GUIDE VASCULAR ACCESS: CPT | Performed by: SURGERY

## 2025-06-27 PROCEDURE — 99152 MOD SED SAME PHYS/QHP 5/>YRS: CPT | Performed by: SURGERY

## 2025-06-27 PROCEDURE — 2780000003 HC OR 278 NO HCPCS: Performed by: SURGERY

## 2025-06-27 PROCEDURE — C2623 CATH, TRANSLUMIN, DRUG-COAT: HCPCS | Performed by: SURGERY

## 2025-06-27 PROCEDURE — 7100000009 HC PHASE TWO TIME - INITIAL BASE CHARGE: Performed by: SURGERY

## 2025-06-27 PROCEDURE — 85027 COMPLETE CBC AUTOMATED: CPT | Performed by: SURGERY

## 2025-06-27 PROCEDURE — 2550000001 HC RX 255 CONTRASTS: Performed by: SURGERY

## 2025-06-27 PROCEDURE — C1769 GUIDE WIRE: HCPCS | Performed by: SURGERY

## 2025-06-27 PROCEDURE — 85610 PROTHROMBIN TIME: CPT | Performed by: SURGERY

## 2025-06-27 PROCEDURE — C1760 CLOSURE DEV, VASC: HCPCS | Performed by: SURGERY

## 2025-06-27 PROCEDURE — 37224 HC REVASCULARIZE FEM/POP ARTERY,ANGIOPLASTY: CPT | Mod: LT | Performed by: SURGERY

## 2025-06-27 PROCEDURE — 2500000001 HC RX 250 WO HCPCS SELF ADMINISTERED DRUGS (ALT 637 FOR MEDICARE OP): Performed by: SURGERY

## 2025-06-27 PROCEDURE — 99153 MOD SED SAME PHYS/QHP EA: CPT | Performed by: SURGERY

## 2025-06-27 PROCEDURE — C1725 CATH, TRANSLUMIN NON-LASER: HCPCS | Performed by: SURGERY

## 2025-06-27 PROCEDURE — 36415 COLL VENOUS BLD VENIPUNCTURE: CPT | Performed by: SURGERY

## 2025-06-27 PROCEDURE — 37224 PR REVSC OPN/PRG FEM/POP W/ANGIOPLASTY UNI: CPT | Performed by: SURGERY

## 2025-06-27 PROCEDURE — G0269 OCCLUSIVE DEVICE IN VEIN ART: HCPCS | Performed by: SURGERY

## 2025-06-27 PROCEDURE — 80048 BASIC METABOLIC PNL TOTAL CA: CPT | Performed by: SURGERY

## 2025-06-27 RX ORDER — CLOPIDOGREL BISULFATE 75 MG/1
75 TABLET ORAL DAILY
Qty: 90 TABLET | Refills: 0 | Status: CANCELLED | OUTPATIENT
Start: 2025-06-27 | End: 2025-09-25

## 2025-06-27 RX ORDER — MIDAZOLAM HYDROCHLORIDE 1 MG/ML
INJECTION, SOLUTION INTRAMUSCULAR; INTRAVENOUS AS NEEDED
Status: DISCONTINUED | OUTPATIENT
Start: 2025-06-27 | End: 2025-06-27 | Stop reason: HOSPADM

## 2025-06-27 RX ORDER — FENTANYL CITRATE 50 UG/ML
INJECTION, SOLUTION INTRAMUSCULAR; INTRAVENOUS AS NEEDED
Status: DISCONTINUED | OUTPATIENT
Start: 2025-06-27 | End: 2025-06-27 | Stop reason: HOSPADM

## 2025-06-27 RX ORDER — IODIXANOL 270 MG/ML
INJECTION, SOLUTION INTRAVASCULAR AS NEEDED
Status: DISCONTINUED | OUTPATIENT
Start: 2025-06-27 | End: 2025-06-27 | Stop reason: HOSPADM

## 2025-06-27 RX ORDER — CLOPIDOGREL BISULFATE 75 MG/1
150 TABLET ORAL ONCE
Status: COMPLETED | OUTPATIENT
Start: 2025-06-27 | End: 2025-06-27

## 2025-06-27 RX ORDER — CLOPIDOGREL BISULFATE 75 MG/1
75 TABLET ORAL DAILY
Qty: 90 TABLET | Refills: 0 | Status: SHIPPED | OUTPATIENT
Start: 2025-06-27 | End: 2025-09-25

## 2025-06-27 RX ORDER — SODIUM CHLORIDE 9 MG/ML
INJECTION, SOLUTION INTRAVENOUS CONTINUOUS PRN
Status: COMPLETED | OUTPATIENT
Start: 2025-06-27 | End: 2025-06-27

## 2025-06-27 RX ORDER — LIDOCAINE HYDROCHLORIDE 20 MG/ML
INJECTION, SOLUTION INFILTRATION; PERINEURAL AS NEEDED
Status: DISCONTINUED | OUTPATIENT
Start: 2025-06-27 | End: 2025-06-27 | Stop reason: HOSPADM

## 2025-06-27 RX ADMIN — CLOPIDOGREL BISULFATE 150 MG: 75 TABLET ORAL at 16:35

## 2025-06-27 ASSESSMENT — PAIN SCALES - GENERAL
PAINLEVEL_OUTOF10: 0 - NO PAIN

## 2025-06-27 ASSESSMENT — COLUMBIA-SUICIDE SEVERITY RATING SCALE - C-SSRS
6. HAVE YOU EVER DONE ANYTHING, STARTED TO DO ANYTHING, OR PREPARED TO DO ANYTHING TO END YOUR LIFE?: NO
2. HAVE YOU ACTUALLY HAD ANY THOUGHTS OF KILLING YOURSELF?: NO
1. IN THE PAST MONTH, HAVE YOU WISHED YOU WERE DEAD OR WISHED YOU COULD GO TO SLEEP AND NOT WAKE UP?: NO

## 2025-06-27 ASSESSMENT — PAIN - FUNCTIONAL ASSESSMENT
PAIN_FUNCTIONAL_ASSESSMENT: 0-10
PAIN_FUNCTIONAL_ASSESSMENT: 0-10

## 2025-06-27 NOTE — Clinical Note
Closure device placed in the left femoral artery. Site closed by Mycarmel[employer]. Deployed By: Samia Bautista MD

## 2025-06-27 NOTE — Clinical Note
Closure device placed in the right femoral artery. Site closed by Mycarmelita. Deployed By: Samia Bautista MD

## 2025-06-27 NOTE — NURSING NOTE
Discharge instructions given to patient with son at the bedside. Groin site stable with no signs of bleeding or hematoma. VSS with no complaints of pain. PIV removed and all belongings returned. Patient discharge via wheelchair by cath lab RN.

## 2025-06-28 NOTE — OP NOTE
Lower Extremity Angiogram (L) Operative Note     Date: 2025  OR Location: [facility] Cardiac Cath Lab    Name: Josh Rehman Jr., : 1955, Age: 70 y.o., MRN: [id number], Sex: male    Diagnosis  Pre-op Diagnosis      * PAD (peripheral artery disease) [I73.9]     * Claudication [I73.9]     * S/P bypass graft of extremity [Z95.828] Post-op Diagnosis     * PAD (peripheral artery disease) [I73.9]     * Claudication [I73.9]     * S/P bypass graft of extremity [Z95.828]     Procedures  Aortogram via US guided access of cross-femoral bypass graft  Selective left lower extremity angiogram  Balloon angioplasty of left superficial femoral and popliteal arteries  Balloon angioplasty of left profunda femoris artery  Completion imaging  Mynx closure of access site      Surgeons      * Samia Bautista - Primary    Resident/Fellow/Other Assistant:  Surgeons and Role:  * No surgeons found with a matching role *    Staff:   Circulator: Caty Cavazos Person: Edd  Circulator: James  Circulator: Jose    Anesthesia Staff: No anesthesia staff entered.    Procedure Summary  Anesthesia: Anesthesia type not filed in the log.  ASA: ASA status not filed in the log.  Estimated Blood Loss: 5mL  Intra-op Medications:   Administrations occurring from 1426 to 1551 on 25:   Medication Name Total Dose   sodium chloride 0.9% infusion Cannot be calculated   midazolam (Versed) injection 2 mg   fentaNYL PF (Sublimaze) injection 100 mcg   lidocaine (Xylocaine) 20 mg/mL (2 %) injection 10 mL   iodixanol (VISIPaque) 270 mg iodine/mL injection 75 mL         Intraprocedure I/O Totals       None           Specimen: No specimens collected              Drains and/or Catheters: * None in log *    Tourniquet Times:         Implants:     Findings: stenosis of left superficial femoral, popliteal, and profunda femoris arteries    Indications: Josh Rehman Jr. is an 70 y.o. male who is having surgery for PAD with claudication.    The patient was  seen in the preoperative area. The risks, benefits, complications, treatment options, non-operative alternatives, expected recovery and outcomes were discussed with the patient. The possibilities of reaction to medication, pulmonary aspiration, injury to surrounding structures, bleeding, recurrent infection, the need for additional procedures, failure to diagnose a condition, and creating a complication requiring transfusion or operation were discussed with the patient. The patient concurred with the proposed plan, giving informed consent.  The site of surgery was properly noted/marked if necessary per policy. The patient has been actively warmed in preoperative area. Preoperative antibiotics are not indicated. Venous thrombosis prophylaxis are not indicated.    Procedure Details:   After the proper preoperative workup and informed consent was obtained, the patient was taken to the procedure room and laid in the supine position. He was placed on hemodynamic monitoring and given moderate sedation in the form of fentanyl and Versed. Bilateral groins were prepped and draped in sterile fashion. The cross femoral bypass was visualized under duplex ultrasound near the proximal anastomosis in the right groin. It was noted to be patent with no significant stenosis. It was therefore accessed using a micropuncture needle under direct ultrasound visualization. Micropuncture sheath was placed via Seldinger technique. This was immediately upsized to a 5 Mosotho sheath. The sheath was flushed.  Then an angiogram was performed of the cross femoral bypass.  It was found to be patent without significant stenosis.  The distal anastomosis was found to be patent as well.  Then a left lower extremity angiogram was performed in a sequential fashion from the groin to the foot.  This did reveal that the left common femoral artery was patent with no significant stenosis.  However there was a high-grade stenosis at the origin of both the  superficial femoral and profunda femoris arteries.  The remainder of the profunda femoris artery appeared to be patent.  The superficial femoral artery was stenotic throughout its course, as was the popliteal artery to the knee.  Below the knee the popliteal artery was patent without significant stenosis, and there is a three-vessel runoff to the foot with good collaterals.    Decision was made to treat the lesion seen in the left superficial femoral, popliteal, and profunda femoris arteries. The 5 French sheath was upsized to a 6 French sheath. The patient was then given a bolus of 6000 units of heparin. Of note ACT's were checked periodically and heparin redosed as needed.  An 035 glide advantage wire was passed through the superficial femoral and popliteal arteries, and into the proximal peroneal artery with the aid of a glide catheter.  A 5 x 220 mm loop tonics drug-coated balloon was selected and placed across the lesion in the distal superficial femoral and popliteal arteries.  The balloon was inflated with note of waste, but it did inflate to profile.  It was held for 2 minutes and then deflated and removed.  A second 5 x 220 mm loop tonics drug-coated balloon was selected and placed across the lesion in the proximal and mid superficial femoral artery.  The balloon was inflated with note of waste, but it did inflate to profile.  It was held for 2 minutes and then deflated and removed.  Follow-up imaging revealed significant improvement in caliber and flow with minimal residual stenosis in the mid to distal superficial femoral artery and the popliteal artery.  However there was significant residual stenosis in the origin and proximal superficial femoral artery.  Therefore we did upsized to a 6 x 40 mm Robeson balloon.  This balloon was placed across the lesion and inflated with note of waste, but it did inflate to profile.  It was held for 2 minutes and then deflated and removed.  Follow-up imaging revealed  improvement in caliber and flow.  However there was still a tight lesion noted at the origin of the profunda femoris artery.  Therefore an 018 command wire was placed alongside the glide advantage wire and then passed down the profunda femoris artery.  Then a 5 x 40 mm Blanding balloon was selected and placed across the lesion in the profunda femoris artery.  The balloon was inflated with note of waste, but it did inflate to profile.  It was held for 2 minutes and then deflated and removed.  Follow-up imaging revealed significant improvement in caliber and flow at the origin of both the superficial femoral and profunda femoris arteries.  There is minimal residual stenosis.  Runoff imaging revealed that the remainder of the SFA and popliteal arteries are widely patent.  There is still a three-vessel runoff noted with good collaterals on the foot.    Therefore at this time intervention was completed.  All wires and catheters were removed.  The sheath was removed and the access site was closed with a Mynx closure device with good hemostasis achieved.  The procedure was then completed.  The patient tolerated the procedure well and there were no complications.  He was transferred to the recovery area in stable condition.       Complications:  None; patient tolerated the procedure well.    Disposition: PACU - hemodynamically stable.  Condition: stable                 Additional Details: none    Attending Attestation: I performed the procedure.    Samia Bautista  Phone Number: [phone]

## 2025-07-08 ENCOUNTER — APPOINTMENT (OUTPATIENT)
Dept: VASCULAR SURGERY | Facility: CLINIC | Age: 70
End: 2025-07-08
Payer: MEDICARE

## 2025-07-08 VITALS
SYSTOLIC BLOOD PRESSURE: 124 MMHG | DIASTOLIC BLOOD PRESSURE: 82 MMHG | HEART RATE: 66 BPM | WEIGHT: 136.6 LBS | HEIGHT: 67 IN | BODY MASS INDEX: 21.44 KG/M2

## 2025-07-08 DIAGNOSIS — Z95.828 S/P BYPASS GRAFT OF EXTREMITY: ICD-10-CM

## 2025-07-08 DIAGNOSIS — I73.9 PAD (PERIPHERAL ARTERY DISEASE): Primary | ICD-10-CM

## 2025-07-08 PROCEDURE — 3008F BODY MASS INDEX DOCD: CPT | Performed by: SURGERY

## 2025-07-08 PROCEDURE — 3079F DIAST BP 80-89 MM HG: CPT | Performed by: SURGERY

## 2025-07-08 PROCEDURE — 1159F MED LIST DOCD IN RCRD: CPT | Performed by: SURGERY

## 2025-07-08 PROCEDURE — 1036F TOBACCO NON-USER: CPT | Performed by: SURGERY

## 2025-07-08 PROCEDURE — 1160F RVW MEDS BY RX/DR IN RCRD: CPT | Performed by: SURGERY

## 2025-07-08 PROCEDURE — 3074F SYST BP LT 130 MM HG: CPT | Performed by: SURGERY

## 2025-07-08 PROCEDURE — 99024 POSTOP FOLLOW-UP VISIT: CPT | Performed by: SURGERY

## 2025-07-08 ASSESSMENT — ENCOUNTER SYMPTOMS
PSYCHIATRIC NEGATIVE: 1
CARDIOVASCULAR NEGATIVE: 1
ENDOCRINE NEGATIVE: 1
ALLERGIC/IMMUNOLOGIC NEGATIVE: 1
NEUROLOGICAL NEGATIVE: 1
RESPIRATORY NEGATIVE: 1
CONSTITUTIONAL NEGATIVE: 1
GASTROINTESTINAL NEGATIVE: 1
BRUISES/BLEEDS EASILY: 1
MUSCULOSKELETAL NEGATIVE: 1

## 2025-07-08 NOTE — PROGRESS NOTES
History Of Present Illness  Josh Rehman Jr. is a 70 y.o. male presenting for follow-up after left leg angiogram with intervention.  He states his claudication symptoms have improved significantly since the procedure.  He denies any access site issues.  However he does report easy bruising since starting clopidogrel.     Past Medical History  He has a past medical history of Personal history of other diseases of the circulatory system (11/15/2022), Personal history of other diseases of the circulatory system (11/15/2022), Personal history of other diseases of the circulatory system (11/15/2022), Personal history of other diseases of the circulatory system (11/15/2022), and Personal history of other endocrine, nutritional and metabolic disease (11/15/2022).    Surgical History  He has a past surgical history that includes Other surgical history (05/30/2019); Other surgical history (05/30/2019); Other surgical history (11/26/2019); Other surgical history (01/05/2023); Other surgical history (11/21/2022); Other surgical history (06/22/2021); CT angio aorta and bilateral iliofemoral runoff including without contrast if performed (5/6/2019); CT angio coronary art with heartflow if score >30% (8/19/2022); Invasive Vascular Procedure (Left, 6/27/2025); and Coronary artery bypass graft (December 2022).     Social History  He reports that he quit smoking about 2 years ago. His smoking use included cigarettes. He has a 28.5 pack-year smoking history. He has never used smokeless tobacco. He reports current alcohol use of about 3.0 standard drinks of alcohol per week. He reports that he does not use drugs.    Family History  Family History[1]     Allergies  Cilostazol and Penicillins    Review of Systems   Constitutional: Negative.    HENT: Negative.     Respiratory: Negative.     Cardiovascular: Negative.    Gastrointestinal: Negative.    Endocrine: Negative.    Genitourinary: Negative.    Musculoskeletal: Negative.   "  Allergic/Immunologic: Negative.    Neurological: Negative.    Hematological:  Bruises/bleeds easily.   Psychiatric/Behavioral: Negative.          Physical Exam  Vitals reviewed.   Constitutional:       General: He is not in acute distress.     Appearance: Normal appearance. He is normal weight.   HENT:      Head: Normocephalic and atraumatic.   Eyes:      Extraocular Movements: Extraocular movements intact.      Conjunctiva/sclera: Conjunctivae normal.   Cardiovascular:      Rate and Rhythm: Normal rate and regular rhythm.      Pulses:           Femoral pulses are 2+ on the right side and 2+ on the left side.       Dorsalis pedis pulses are 1+ on the right side and 1+ on the left side.        Posterior tibial pulses are detected w/ Doppler on the right side and 2+ on the left side.      Heart sounds: Normal heart sounds.   Pulmonary:      Effort: Pulmonary effort is normal.      Breath sounds: Normal breath sounds.   Abdominal:      General: Abdomen is flat.      Palpations: Abdomen is soft.   Musculoskeletal:         General: No swelling or tenderness. Normal range of motion.      Cervical back: Normal range of motion.      Right lower leg: No edema.      Left lower leg: No edema.   Skin:     General: Skin is warm and dry.      Capillary Refill: Capillary refill takes less than 2 seconds.   Neurological:      General: No focal deficit present.      Mental Status: He is alert and oriented to person, place, and time.      Cranial Nerves: No cranial nerve deficit.      Sensory: No sensory deficit.      Motor: No weakness.   Psychiatric:         Mood and Affect: Mood normal.         Behavior: Behavior normal.          Last Recorded Vitals  Blood pressure 124/82, pulse 66, height 1.702 m (5' 7\"), weight 62 kg (136 lb 9.6 oz).    Relevant Results    Current Medications[2]      Assessment/Plan   Diagnoses and all orders for this visit:  PAD (peripheral artery disease)  -     CAROLA without exercise; Future  S/P bypass " graft of extremity  -     CAROLA without exercise; Future      71yo doing well status post left leg angiogram with intervention.  He has had significant improvement in his claudication symptoms.  On exam today he does have a palpable left pedal pulses.  No further intervention is indicated at this time.  He is to continue management with aspirin, clopidogrel, and atorvastatin.  We have discussed his bruising episodes.  As long as it is limited to occasional bruising and no more serious bleeding issues, I have recommended that he still continue with clopidogrel until 90 days after his procedure.  He is to follow-up in 1 month with a surveillance CAROLA study.       (This note was generated with voice recognition software and may contain errors including spelling, grammar, syntax and missed recognition of what was dictated, of which may not have been fully corrected)        Samia Bautista MD, FACS, [facility]  Vascular & Endovascular Surgery  [facility]          [1] No family history on file.  [2]   Current Outpatient Medications:     allopurinol (Zyloprim) 100 mg tablet, Take 1 tablet (100 mg) by mouth once daily., Disp: 90 tablet, Rfl: 3    aspirin 81 mg EC tablet, Take 1 tablet (81 mg) by mouth once daily., Disp: , Rfl:     atorvastatin (Lipitor) 40 mg tablet, Take 1 tablet (40 mg) by mouth once daily., Disp: 90 tablet, Rfl: 3    clopidogrel (Plavix) 75 mg tablet, Take 1 tablet (75 mg) by mouth once daily., Disp: 90 tablet, Rfl: 0    esomeprazole (NexIUM) 20 mg DR capsule, Take 1 capsule (20 mg) by mouth once daily., Disp: , Rfl:     folic acid (Folvite) 1 mg tablet, Take 1 tablet (1 mg) by mouth once daily., Disp: , Rfl:     lisinopril 20 mg tablet, Take 1 tablet (20 mg) by mouth once daily., Disp: 90 tablet, Rfl: 3    metoprolol succinate XL (Toprol-XL) 50 mg 24 hr tablet, Take 1 tablet (50 mg) by mouth 2 times a day., Disp: 180 tablet, Rfl: 3    multivitamin tablet, Take 1 tablet by mouth  once daily., Disp: , Rfl:     omega 3-dha-epa-fish oil (Fish OiL) 300-1,000 mg capsule,delayed release(DR/EC), Take by mouth once daily., Disp: , Rfl:     acetaminophen (Tylenol) 325 mg tablet, Take 2 tablets (650 mg) by mouth if needed., Disp: , Rfl:

## 2025-08-05 ENCOUNTER — HOSPITAL ENCOUNTER (OUTPATIENT)
Dept: VASCULAR MEDICINE | Facility: CLINIC | Age: 70
Discharge: HOME | End: 2025-08-05
Payer: MEDICARE

## 2025-08-05 DIAGNOSIS — I73.9 PAD (PERIPHERAL ARTERY DISEASE): ICD-10-CM

## 2025-08-05 DIAGNOSIS — Z95.828 S/P BYPASS GRAFT OF EXTREMITY: ICD-10-CM

## 2025-08-05 PROCEDURE — 93922 UPR/L XTREMITY ART 2 LEVELS: CPT

## 2025-08-05 PROCEDURE — 93922 UPR/L XTREMITY ART 2 LEVELS: CPT | Performed by: SURGERY

## 2025-08-12 ENCOUNTER — APPOINTMENT (OUTPATIENT)
Dept: VASCULAR SURGERY | Facility: CLINIC | Age: 70
End: 2025-08-12
Payer: MEDICARE

## 2025-08-12 VITALS
SYSTOLIC BLOOD PRESSURE: 120 MMHG | OXYGEN SATURATION: 97 % | BODY MASS INDEX: 20.88 KG/M2 | WEIGHT: 133 LBS | DIASTOLIC BLOOD PRESSURE: 74 MMHG | HEIGHT: 67 IN | HEART RATE: 59 BPM

## 2025-08-12 DIAGNOSIS — Z95.828 S/P BYPASS GRAFT OF EXTREMITY: ICD-10-CM

## 2025-08-12 DIAGNOSIS — I73.9 PAD (PERIPHERAL ARTERY DISEASE): Primary | ICD-10-CM

## 2025-08-12 PROCEDURE — 3078F DIAST BP <80 MM HG: CPT | Performed by: SURGERY

## 2025-08-12 PROCEDURE — 1159F MED LIST DOCD IN RCRD: CPT | Performed by: SURGERY

## 2025-08-12 PROCEDURE — 3008F BODY MASS INDEX DOCD: CPT | Performed by: SURGERY

## 2025-08-12 PROCEDURE — 3074F SYST BP LT 130 MM HG: CPT | Performed by: SURGERY

## 2025-08-12 PROCEDURE — 99214 OFFICE O/P EST MOD 30 MIN: CPT | Performed by: SURGERY

## 2025-08-12 PROCEDURE — 1160F RVW MEDS BY RX/DR IN RCRD: CPT | Performed by: SURGERY

## 2025-08-15 ASSESSMENT — ENCOUNTER SYMPTOMS
CONSTITUTIONAL NEGATIVE: 1
RESPIRATORY NEGATIVE: 1
GASTROINTESTINAL NEGATIVE: 1
CARDIOVASCULAR NEGATIVE: 1
NEUROLOGICAL NEGATIVE: 1
ALLERGIC/IMMUNOLOGIC NEGATIVE: 1
MUSCULOSKELETAL NEGATIVE: 1
ENDOCRINE NEGATIVE: 1
PSYCHIATRIC NEGATIVE: 1

## 2026-02-24 ENCOUNTER — APPOINTMENT (OUTPATIENT)
Dept: VASCULAR SURGERY | Facility: CLINIC | Age: 71
End: 2026-02-24
Payer: MEDICARE

## (undated) DEVICE — GUIDEWIRE, COMMAND ST, HI-TORQUE, 0.18 X 300CM

## (undated) DEVICE — SHEATH, PINNACLE, W/.038 GW 10CM, 5FR INTRODUCER, 2.5 CM DIALATOR

## (undated) DEVICE — ACCESS KIT, MINI MAK, 4FR X 10CML, 0.018 X 40CM, SS/SS, ECHO ENHANCED 7CM NDL

## (undated) DEVICE — SHEATH, PINNACLE, W/.038 GUIDEWIRE, 10 CM,  6FR INTRODUCER, 6FR DIA, 2.5 CM DIALATOR

## (undated) DEVICE — CLOSURE DEVICE, VASCULAR, MYNX CONTROL, 6FR/7FR

## (undated) DEVICE — Device

## (undated) DEVICE — GUIDEWIRE, ADVANTAGE, .035 X 260

## (undated) DEVICE — DEVICE, INFLATION, PRESTO ID40ATM 30CC

## (undated) DEVICE — CATHETER, ARMADA 18PTA, 5.0MM X 40MM X 150CM

## (undated) DEVICE — CATHETER, ANGIO, SOFT-VU, OMNI FLUSH, 0.035 IN, 4 FR X 65 CM

## (undated) DEVICE — CATHETER, 4 FR. 65CM, ANGLE NON-TAPER AT TIP

## (undated) DEVICE — GUIDEWIRE, BENTSON, COATED, 0.035 IN X 180 CM

## (undated) DEVICE — CATHETER, BALLOON, PTA, DORADO, 6 X 4 X 80